# Patient Record
Sex: FEMALE | Race: WHITE
[De-identification: names, ages, dates, MRNs, and addresses within clinical notes are randomized per-mention and may not be internally consistent; named-entity substitution may affect disease eponyms.]

---

## 2020-05-03 NOTE — NUR
went to go check on pt and she was not in her bed.  pt's gown was on the bed 
and pt was no where to be found.  md notified.  pt had previously stated she 
had wanted to leave [facility] but had initially agreed on waiting until her second 
set of troponins came back.  pt did not have an iv due to pt refusing that as 
well.

## 2020-05-03 NOTE — NUR
pt s/w hospitalist and stated she did not want to be admitted and would just 
return to [facility] for further treatment

## 2020-08-17 ENCOUNTER — HOSPITAL ENCOUNTER (INPATIENT)
Dept: HOSPITAL 94 - ADULT MH | Age: 65
LOS: 37 days | Discharge: ANOTHER HOSPITAL | DRG: 751 | End: 2020-09-23
Attending: PSYCHIATRY & NEUROLOGY | Admitting: PSYCHIATRY & NEUROLOGY
Payer: COMMERCIAL

## 2020-08-17 VITALS — BODY MASS INDEX: 26.05 KG/M2 | HEIGHT: 64 IN | WEIGHT: 152.56 LBS

## 2020-08-17 VITALS — SYSTOLIC BLOOD PRESSURE: 97 MMHG | DIASTOLIC BLOOD PRESSURE: 45 MMHG

## 2020-08-17 DIAGNOSIS — E46: ICD-10-CM

## 2020-08-17 DIAGNOSIS — K59.00: ICD-10-CM
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
## 2020-08-17 DIAGNOSIS — D64.9: ICD-10-CM

## 2020-08-17 DIAGNOSIS — E86.0: ICD-10-CM

## 2020-08-17 DIAGNOSIS — T67.5XXA: ICD-10-CM
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
## 2020-08-17 DIAGNOSIS — Z59.0: ICD-10-CM

## 2020-08-17 DIAGNOSIS — G92: ICD-10-CM

## 2020-08-17 DIAGNOSIS — E87.6: ICD-10-CM

## 2020-08-17 DIAGNOSIS — F20.9: ICD-10-CM

## 2020-08-17 DIAGNOSIS — F29: Primary | ICD-10-CM

## 2020-08-17 DIAGNOSIS — F41.9: ICD-10-CM

## 2020-08-17 DIAGNOSIS — B85.2: ICD-10-CM

## 2020-08-17 DIAGNOSIS — F39: ICD-10-CM

## 2020-08-17 DIAGNOSIS — I25.10: ICD-10-CM

## 2020-08-17 DIAGNOSIS — I50.9: ICD-10-CM

## 2020-08-17 DIAGNOSIS — F32.9: ICD-10-CM

## 2020-08-17 PROCEDURE — 74018 RADEX ABDOMEN 1 VIEW: CPT

## 2020-08-17 PROCEDURE — 36415 COLL VENOUS BLD VENIPUNCTURE: CPT

## 2020-08-17 PROCEDURE — 80053 COMPREHEN METABOLIC PANEL: CPT

## 2020-08-17 PROCEDURE — 87081 CULTURE SCREEN ONLY: CPT

## 2020-08-17 PROCEDURE — 71045 X-RAY EXAM CHEST 1 VIEW: CPT

## 2020-08-17 PROCEDURE — 97116 GAIT TRAINING THERAPY: CPT

## 2020-08-17 PROCEDURE — 97162 PT EVAL MOD COMPLEX 30 MIN: CPT

## 2020-08-17 PROCEDURE — 99285 EMERGENCY DEPT VISIT HI MDM: CPT

## 2020-08-17 SDOH — ECONOMIC STABILITY - HOUSING INSECURITY: HOMELESSNESS: Z59.0

## 2020-08-17 NOTE — NUR
Admission Note

Why is patient here:



This is a 65-year-old female who was brought to the Emergency Room via EMS.  She was found 
in a pile of dirt and leaves on the ground.  The patient is somewhat confused and having 
auditory

hallucinations.  The patient has a history of schizophrenia as reported by the

ER nursing staff.  The patient stated she was feeling weak and exhausted. Patient is 
reported to have a schizophrenia history and is homeless. Patient initially was placed on a 
1799 hold as she was deemed gravely disabled by the ER Physician. Patient was then admitted 
to the Neuro floor where she was treated for heat exhaustion, metabolic encephalopathy, 
hypokalemia, and head lice. With those issues now resolved the patient is presented to the 
[facility] unit for being gravely disabled. A 5150 was placed on this date 
8/17/20 at 2130 hours for GD. The patient tolerated the admission process well but refused 
to sign paperwork. Patient exhibited understanding of 5150 process following explanation by 
the admitting nurse. Patient is slow to answer questions. Patient is delusional, she 
explains to this writer that she has been excluded from the "[facility] [employer]." Patient states 
she hears voices but will not elaborate on what they are saying.

## 2020-08-18 VITALS — SYSTOLIC BLOOD PRESSURE: 134 MMHG | DIASTOLIC BLOOD PRESSURE: 75 MMHG

## 2020-08-18 VITALS — DIASTOLIC BLOOD PRESSURE: 70 MMHG | SYSTOLIC BLOOD PRESSURE: 115 MMHG

## 2020-08-18 NOTE — NUR
Nursing Progress Note: 



Voluntary Hold



Legal hold: 5150



Report received from RN with use of SBAR



Why are they here: 

This is a 65-year-old female who was brought to the Emergency Room via EMS. She was found in 
a pile of dirt and leaves on the ground. The patient is somewhat confused and having 
auditory hallucinations. The patient has a history of schizophrenia, homelessness, and was 
weakand exhausted as reported by the ER nursing staff. Patient initially was placed on a 
1799 hold as she was deemed gravely disabled by the ER Physician. Patient was then admitted 
to the Neuro floor where she was treated for heat exhaustion, metabolic encephalopathy, 
hypokalemia, and head lice. A 5150 was placed on this date 8/17/20 at 2130 hours for GD. The 
patient tolerated the admission process well but refused to sign paperwork. Patient 
exhibited understanding of 5150 process following explanation by the admitting nurse. 
Patient is slow to answer questions. Patient is delusional, she explains to this writer that 
she has been excluded from the "[facility] [employer]." Patient states she hears voices but will 
not elaborate on what they are saying. 



Assessment



What has happened this shift:  

Received Pt in bed sleeping w/o distress at beginning of shift. Isolated to room for most of 
day. Walked about in late morning for a bit and attempted to open door by Group Room and 
responded well to limits and to not touch doors. Pt refused to talk beyond one sentence at a 
time and requested to talk later all day. Pt ate minimally and sat/slept in her chair 
intermittently. Pt appears to be fearful and is guarded with this RN. 



S/I, H/I: Pt denies

A/VH: Pt denies 

Sleep: Pt slept late 

ADL's: Independent. 

Group attendance: No

Were meds taken: No

Any med S/E: None 





Mental Status Exam



Appearance: Casual in scrubs

Eye contact: Good

Behavior: Isolative

Speech: Minimal, soft

Mood: Fearful

Affect: Congruent with mood

Thought process: Confused

Thought Content: Unable to assess 

Cognition: A&O X2

Insight: Poor

Judgement: Poor



Interventions



PRN's used: None



Therapeutic interventions: Maintained a safe and therapeutic environment, provided clear and 
simple instructions, monitored behaviors and needed intervention, provided positive 
encouragement, Q15min safety checks. 



Restraints/seclusion/emergency medication: N/A



Justification of Continued Inpatient Treatment: Patient continues to require medication 
adjustment in a safe and supportive environment until stable.

## 2020-08-18 NOTE — NUR
Nursing Progress Note: 



Voluntary Hold



Legal hold: 5150



Report received from RN with use of SBAR



Why are they here: 

This is a 65-year-old female who was brought to the Emergency Room via EMS. She was found in 
a pile of dirt and leaves on the ground. The patient is somewhat confused and having 
auditory hallucinations. The patient has a history of schizophrenia, homelessness, and was 
weakand exhausted as reported by the ER nursing staff. Patient initially was placed on a 
1799 hold as she was deemed gravely disabled by the ER Physician. Patient was then admitted 
to the Neuro floor where she was treated for heat exhaustion, metabolic encephalopathy, 
hypokalemia, and head lice. A 5150 was placed on this date 8/17/20 at 2130 hours for GD. The 
patient tolerated the admission process well but refused to sign paperwork. Patient 
exhibited understanding of 5150 process following explanation by the admitting nurse. 
Patient is slow to answer questions. Patient is delusional, she explains to this writer that 
she has been excluded from the "[facility] [employer]." Patient states she hears voices but will 
not elaborate on what they are saying. 



Assessment



What has happened this shift:  

Pt was lying in her bed in the fetal position at shift change and isolated to her room all 
evening.  Pt's roommate was sitting in the chair talking loudly but pt stated that she 
wasn't bothered by it.  Pt presents as paranoid and confused.  Pt would only answer with one 
word replies and did not offer up much info as to how she is doing.  Pt denied having any 
complaints and did not get out of bed for snack.  



S/I, H/I: Pt denies

A/VH: Pt denies 

Sleep: see sleep assessment

ADL's: Independent. 

Group attendance: No

Were meds taken: No

Any med S/E: None 





Mental Status Exam



Appearance: in scrubs, lying in bed in fetal position

Eye contact: fair

Behavior: Isolative

Speech: Minimal, soft

Mood: Fearful

Affect: Congruent with mood

Thought process: Confused

Thought Content: Unable to assess 

Cognition: A&O X2

Insight: Poor

Judgement: Poor



Interventions



PRN's used: None



Therapeutic interventions: Maintained a safe and therapeutic environment, provided clear and 
simple instructions, monitored behaviors and needed intervention, provided positive 
encouragement, Q15min safety checks. 



Restraints/seclusion/emergency medication: N/A



Justification of Continued Inpatient Treatment: Patient continues to require medication 
adjustment in a safe and supportive environment until stable.

## 2020-08-19 VITALS — DIASTOLIC BLOOD PRESSURE: 83 MMHG | SYSTOLIC BLOOD PRESSURE: 144 MMHG

## 2020-08-19 NOTE — NUR
Nursing Progress Note:



Legal hold: 5150



Client on voluntary/involuntary status for GD



Report received from nurse with use of SBAR: YAMILKA Colón



Why are they here: This is a 65-year-old female who was brought to the Emergency Room via 
EMS. She was found in a pile of dirt and leaves on the ground. The patient is somewhat 
confused and having auditory hallucinations. The patient has a history of schizophrenia, 
homelessness, and was weak and exhausted as reported by the ER nursing staff. Patient was 
then admitted to the Neuro floor where she was treated for heat exhaustion, metabolic 
encephalopathy, hypokalemia, and head lice. A 5150 was placed for GD. On admission, patient 
is delusional, she explains to this writer that she has been excluded from the "[facility] 
[employer]." Patient states she hears voices but will not elaborate on what they are saying. 



Assessment



What has happened this shift: Received pt. sleeping in bed at the beginning of the shift, 
she awoke for breakfast, however refused to eat in Group Room and ate meal in her room. Pt. 
only consumed about 25% of meal, and refused ordered HBA1C and Lipid labs, Dr. Roy 
notified. Attempted to complete 1:1 at bedside, however pt. non-compliant with mental health 
or physical assessment. When asked questions pt. responds very softly, inaudibly at times, 
or stares blankly ahead. She appears to be internally preoccupied and paranoid, staring at 
this writer with a look of fear on her face. Pt. refuses any PRN anxiolytics or 
interventions at this time, and isolates in bed throughout the day. Later, pt. asks this 
writer if she knows any Christians, she states, There's a meeting in the community room if 
they want to talk." This writer asked pt. if she would like to talk, and she stated in a 
paranoid delusional way, "No, please don't talk to me. I'm not a member of [employer]'s community." 


S/I, H/I: Unable to assess

A/VH: Unable to assess, however pt. appears internally preoccupied

Sleep: Sleep hours are 8.75, she naps throughout the day

ADL's: Requires encouragement

Group attendance: No

Were meds taken: None ordered

Any med S/E: N/A





Mental Status Exam



Appearance: Disheveled, however appropriately dressed

Eye contact: Poor or stares blankly

Behavior: RTC/Cooperative, fatigued, anxious, guarded, and withdrawn

Speech: Soft and hard to hear, does not respond or responds very minimally

Mood: Guarded and paranoid

Affect: Flat

Thought process: Poverty of thought with possible thought blocking

Thought Content: Delusions, religiosity, and possible internal preoccupation

Cognition: Unable to assess

Insight: Poor

Judgment: Poor







Interventions



PRN's used: None

Therapeutic interventions: Introduced self and attempted to establish rapport, maintained a 
safe and therapeutic environment, provided clear and simple instructions, attempted to 
orient to reality, encouraged ADLs and provided positive encouragement, and maintained a 
safe and therapeutic environment. 

Restraints/seclusion/emergency medication: N/A





Justification of Continued Inpatient Treatment: Pt. requires interruption of current crisis, 
medication adjustments, and a safe and supportive environment.

## 2020-08-19 NOTE — NUR
Nursing Progress Note:



Legal hold: 5150 for grave disability



Report received from Yifan PRATHER with use of SBAR



Why are they here: The patient was initially brought to the ER by EMS after she was found in 
the community laying in a pile of dirt and leaves. She was then admitted to the medical unit 
where she was treated for; Altered mental status, heat exhaustion, severe hypokalemia, 
generalized weakness and head lice. She has been a very poor historian and little is known 
about her past psychiatric care. She presented as depressed and appeared psychotic. Once she 
was medically cleared she was admitted to [facility] for stabilization of her psychiatric symptoms. 








Assessment



What has happened this shift: One to one with the patient to assess severity of disordered 
thought processes and to build a therapeutic bond. The patient has not left her room and sat 
in the chair by her bed curled up in a ball. Psychomotor retardation and at times catatonic 
like. She has not made any attempt to socialize or interact with others unless asked a 
specific question. She made poor eye contact. Her affect was pensive and worried but she 
denied anxiety. She was poorly oriented and stated the month was September but she did know 
the year. When asked what season it was she stated she was not sure. She answered most of 
the assessment questions with "I'm not sure" but the patient seemed more guarded than 
confused. Her replies to the assessment questions were slowed and hesitated before she 
replied. She did state she did not have a house but would not elaborate further. She was 
asked if she felt depressed she replied that she felt "just sad" She appeared distracted and 
when asked if she was hearing voices she stated, "I'm not sure" She refused offer of food 
and fluids and she when given a closed container of juice she studied it but then refused to 
drink it. She refused all offer of medications several times.  She refused to allow her 
vital signs to be taken. The charge RN was made aware and she contacted the psychiatrist and 
is aware that the patient has refused meds and food and fluids. She is not endorsing 
thoughts to harm herself or others. When asked how she would provide for her needs when she 
leaves the unit and she replied "I'm not sure" The patient's thought process appears to be 
distracted by internal stimuli. Her insight and judgement are poor.  She is not endorsing 
thoughts to harm herself or others. She is difficult to assess fully because of her minimal 
replies to the assessment questions. Her insight and judgement seemed to be very impaired. 







Justification of Continued Inpatient Treatment: The patient is unable to verbalize a plan 
for food shelter or clothing. She is not eating or drinking today on the unit. She is 
refusing medications and may require a riese if the physician feels it is warranted. It is 
unclear what she has been doing for housing when she is not in the hospital but she has been 
found down on the ground twice in the community in recent months.

## 2020-08-20 RX ADMIN — OLANZAPINE SCH MG: 5 TABLET, FILM COATED ORAL at 21:00

## 2020-08-20 RX ADMIN — PALIPERIDONE SCH MG: 3 TABLET, EXTENDED RELEASE ORAL at 21:00

## 2020-08-20 NOTE — NUR
Nursing Progress Note:



Legal hold: 5150



Client on /involuntary status for GD



Report received from nurse with use of SBAR: YAMILKA Nevarez



Why are they here: This is a 65-year-old female who was brought to the Emergency Room via 
EMS. She was found in a pile of dirt and leaves on the ground. The patient is somewhat 
confused and having auditory hallucinations. The patient has a history of schizophrenia, 
homelessness, and was weak and exhausted as reported by the ER nursing staff. Patient was 
then admitted to the Neuro floor where she was treated for heat exhaustion, metabolic 
encephalopathy, hypokalemia, and head lice. A 5150 was placed for GD. On admission, patient 
is delusional, she explains to this writer that she has been excluded from the "[facility] 
[employer]." Patient states she hears voices but will not elaborate on what they are saying. 



Assessment



What has happened this shift: Pt was sitting in the chair in her room at change of shift. Pt 
isolates to her room and is refusing to eat or drink. Asked patient if there are any foods 
she would prefer rather than what had been provided on her meal tray, Patient shakes her 
head and states "No nothing, I dont want to eat." She shakes her head no when I ask her 
reasons for not eating. Explained to patient I had noticed her sleeping in the chair last 
night and she explained "that bed is plugged into the wall so there is electricity in it, I 
don't think that is healthy." Suggested alternatives so she can rest and she declined saying 
she'd prefer to sleep in the chair. Pt refused meds at med pass. Attempted to educate 
patient on medications and she became irritated and stated "Im just going to wait until they 
force me to take them, I don't want them. " 



S/I, H/I: pt doesnt respond when asked 

A/VH: Continues to appear internally preoccupied AEB distractibility

Sleep: See sleep hours 

ADL's: Requires encouragement

Group attendance: No

Were meds taken: Pt. refused ordered meds

Any med S/E: N/A





Mental Status Exam



Appearance: Disheveled, however adequately dressed

Eye contact: Fair, stares blankly at times

Behavior: Resistive to care, anxious, guarded isolative, slightly agitated, and paranoid

Speech: Pt. answers questions selectively with minimal responses and stares blankly ahead. 

Mood: Guarded and paranoid

Affect: Constricted

Thought process: delusions

Thought Content: Delusions, religiosity, and possible internal preoccupation

Cognition: impaired states she doesnt know why she's here

Insight: Poor

Judgment: Poor







Interventions



PRN's used: None

Therapeutic interventions: Maintained a safe and therapeutic environment, provided clear and 
simple instructions, attempted to orient to reality, encouraged ADLs and provided positive 
encouragement, provided medication education and monitored/encouraged meal intake, and 
maintained Q 15min safety checks.

Restraints/seclusion/emergency medication: N/A





Justification of Continued Inpatient Treatment: Per Dr. Roy, pt. remains gravely disabled, 
and requires medication adjustments and a safe and supportive environment. He plans to file 
a Riese.

## 2020-08-20 NOTE — NUR
nursing note

The patient curled up in her chair she again was encouraged to take medications but she 
continues to decline.

## 2020-08-20 NOTE — NUR
CM



Presenting Issues: 

Pt's not talking to staff, refusing meals & care. She's now on a 5250. 



Interventions: 

SS had contact w/a board & care in [facility], [phone] they confirmed that pt's 
sister, Kirsten lives at the [facility] and pt had visited her sister there numerous times, the two 
have a good sibling relationship. 



Plan: 

When pt is able to talk SS will obtain consent to speak w/the sister and facilitate pt 
contacting her sister. 

Katalina Zacarias LCSW




-------------------------------------------------------------------------------

Addendum: 08/20/20 at 1659 by Katalina Zacarias 

-------------------------------------------------------------------------------

Amended: Links added.

## 2020-08-20 NOTE — NUR
Nursing Progress Note:



Legal hold: 5150



Client on voluntary/involuntary status for GD



Report received from nurse with use of SBAR: YAMILKA Colón



Why are they here: This is a 65-year-old female who was brought to the Emergency Room via 
EMS. She was found in a pile of dirt and leaves on the ground. The patient is somewhat 
confused and having auditory hallucinations. The patient has a history of schizophrenia, 
homelessness, and was weak and exhausted as reported by the ER nursing staff. Patient was 
then admitted to the Neuro floor where she was treated for heat exhaustion, metabolic 
encephalopathy, hypokalemia, and head lice. A 5150 was placed for GD. On admission, patient 
is delusional, she explains to this writer that she has been excluded from the "[facility] 
[employer]." Patient states she hears voices but will not elaborate on what they are saying. 



Assessment



What has happened this shift: Received pt. curled up in a chair at bedside at the beginning 
of the shift, she remained sitting up in this chair throughout the shift. However, pt. does 
get up and pace in her room intermittently, and at one point is found kneeling in a position 
of prayer on the floor. She refused V/S and again refused physical assessment. Pt. continues 
to appear anxious and paranoid, states, "Please no, I'm sorry." She also continues to refuse 
all meals, education provided by this writer on the the importance of eating/drinking in 
order to get better, as pt. is perseverating on discharge and knows her 5150 expires today. 
However, pt. became agitated and threw her whole breakfast tray in the garbage, she stated 
irritably, "I'm aware!" Pt. later apologized for being rude, however continued to refuse all 
meals throughout the day. Dr. Roy made aware and advises staff to continue to encourage and 
monitor patient. 



Attempted to complete 1:1 at bedside, pt. again uncooperative, and continues to answer 
questions selectively with minimal responses and sometimes not at all and will stare blankly 
ahead. When questioned regarding A/V/HA, pt. does not answer, however continues to appear 
internally preoccupied AEB distractibility. Pt. does make a delusional statement regarding 
her belief that she has gonorrhea, and requests to talk to Dr. Roy. However, pt. denies any 
dysuria or vaginal discharge, and when questioned by this writer regarding her s/s, she 
states, "My palms and my scalp are pale." Pt. then reports concern regarding the scabs 
present on her posterior neck, scalp, and scapular regions r/t previous head-lice, however 
she refuses to allow this writer to assess them, will continue to monitor. 



After talking to Dr. Roy this AM, pt. agrees to take Paliperidone and Invega, however when 
this writer attempted to administer medications she refused and continued to be very 
paranoid. Dr. Roy made aware of pt's refusal, and MD again spoke to pt. regarding 
medication in the afternoon and she agreed to take one medication, Paliperidone. This writer 
again attempted to administer medication and provided education, pt. stated, "I don't like 
taking medication, there has to be a better way." Dr. Roy again made aware and plans to 
file a Riese.

S/I, H/I: Unable to assess

A/VH: Continues to appear internally preoccupied AEB distractibility

Sleep: Sleep hours are 7.75, she naps intermittently throughout the day

ADL's: Requires encouragement

Group attendance: No

Were meds taken: Pt. refused ordered Paliperidone and Effexor

Any med S/E: N/A





Mental Status Exam



Appearance: Disheveled, however appropriately dressed

Eye contact: Fair, stares blankly at times

Behavior: Resistive to care, anxious, guarded isolative, slightly agitated, and paranoid

Speech: Pt. answers questions selectively with minimal responses and sometimes not at all 
and will stare blankly ahead. Speech remains soft, however better able to understand pt. 
than previous day.

Mood: Guarded and paranoid

Affect: Constricted

Thought process: Poverty of thought with possible thought blocking

Thought Content: Delusions, religiosity, and possible internal preoccupation

Cognition: Unable to assess

Insight: Poor

Judgment: Poor







Interventions



PRN's used: None

Therapeutic interventions: Maintained a safe and therapeutic environment, provided clear and 
simple instructions, attempted to orient to reality, encouraged ADLs and provided positive 
encouragement, provided medication education and monitored/encouraged meal intake, and 
maintained Q 15min safety checks.

Restraints/seclusion/emergency medication: N/A





Justification of Continued Inpatient Treatment: Per Dr. Roy, pt. remains gravely disabled, 
and requires medication adjustments and a safe and supportive environment. He plans to file 
a Riese.

## 2020-08-20 NOTE — NUR
1:1  Patient was introduced and invited to attend group art therapy today and everyday since 
her admission. Patient remains withdrawn and stating "not today, maybe later...."  Patient 
was asked if she would like to talk further?  Patient did not want to talk any further. 
Patient was asked if she would like to have/hold a 'stuffed animal' (since she had been 
observed frequently in a fetal position.)  Patient replied "No".... she remained soft 
spoken, yet appropriate in her response. 



Adriana Emery MA, LMFT #[id number]

Art Therapist, [facility]

-------------------------------------------------------------------------------

Addendum: 08/21/20 at 1859 by Adriana Emery 

-------------------------------------------------------------------------------

Amended: Links added.

## 2020-08-21 VITALS — DIASTOLIC BLOOD PRESSURE: 79 MMHG | SYSTOLIC BLOOD PRESSURE: 133 MMHG

## 2020-08-21 RX ADMIN — PALIPERIDONE SCH MG: 3 TABLET, EXTENDED RELEASE ORAL at 20:47

## 2020-08-21 RX ADMIN — PALIPERIDONE SCH MG: 3 TABLET, EXTENDED RELEASE ORAL at 21:00

## 2020-08-21 RX ADMIN — OLANZAPINE SCH MG: 5 TABLET, FILM COATED ORAL at 21:00

## 2020-08-21 RX ADMIN — OLANZAPINE SCH MG: 5 TABLET, FILM COATED ORAL at 20:47

## 2020-08-21 RX ADMIN — VENLAFAXINE HYDROCHLORIDE SCH MG: 75 CAPSULE, EXTENDED RELEASE ORAL at 10:29

## 2020-08-21 NOTE — NUR
Nursing Progress Note:



Legal hold: 5250



Client on voluntary/involuntary status for GD



Report received from nurse with use of SBAR: Alesia Churchill RN



Why are they here: This is a 65-year-old female who was brought to the Emergency Room via 
EMS. She was found in a pile of dirt and leaves on the ground. The patient is somewhat 
confused and having auditory hallucinations. The patient has a history of schizophrenia, 
homelessness, and was weak and exhausted as reported by the ER nursing staff. Patient was 
then admitted to the Neuro floor where she was treated for heat exhaustion, metabolic 
encephalopathy, hypokalemia, and head lice. A 5150 was placed for GD. On admission, patient 
is delusional, she explains to this writer that she has been excluded from the "[place]." Patient states she hears voices but will not elaborate on what they are saying. 



Assessment



What has happened this shift: Received pt. curled up in a chair at bedside at the beginning 
of the shift with blanket over her head, she again refused V/S or physical assessment and 
would not remove blanket from head. This writer educated pt. on the need to obtain V/S to 
monitor how she is doing, pt. became agitated and yelled out, "I'm not doing okay!" She 
again refused breakfast and education and encouragement was again provided by this writer 
(packaged snacks offered), however pt. again became agitated and angrily pulled a cup out of 
this writer's hand, stated, "Leave me alone!" EBONY Ramos notified of pt's ongoing refusal 
to eat. This writer re-approached pt. later and offered ordered Effexor, pt. initially 
agreed to take the medication, however when medication brought to her she refused. Pt. 
stared at nurse blankly and stated softly, "Please no." Later at approximately 10:30, pt. 
ambulated to the nurse's station requesting Effexor, she consented to taking this medication 
along with a packaged apple juice. After taking medication, pt. ambulated into the hallway 
looking for the bathroom, she was redirected to the BR in her room, pt. stated in a paranoid 
way, "I think that is hers" (referring to her roommate). She was able to be redirected, and 
consented to using the BR.

Attempted to complete 1:1 at bedside, however pt. continues to stare blankly or answer 
questions selectively with minimal responses. She appears distracted and internally 
preoccupied. Pt. does request to know when her court date is, and she is provided this 
information by writer. Later, pt. is observed to be scratching at healing scabs present on 
head, back, chest, and arms. This writer encourages a shower and pt. complies, able to do so 
independently. Afterwards, ABT ointment is applied to areas per consent from EBONY Ramos. 
Pt. was also able to eat 75% of her lunch, however continued to isolate in her room sitting 
up in her chair throughout the day. 

S/I, H/I: Unable to assess

A/VH: Continues to appear internally preoccupied AEB distractibility and significant delay 
in responses

Sleep: Sleep hours are 7, she naps intermittently throughout the day

ADL's: Requires encouragement

Group attendance: No

Were meds taken: Yes

Any med S/E: N/A





Mental Status Exam



Appearance: Freshly showered and appropriately dressed

Eye contact: Fair, stares blankly at times

Behavior: Resistive to care, anxious, guarded isolative, slightly agitated, and paranoid

Speech: Pt. answers questions selectively with minimal responses and sometimes not at all 
and will stare blankly ahead. Speech remains soft, however becomes louder when agitated. 

Mood: Guarded and paranoid

Affect: Constricted

Thought process: Poverty of thought with possible thought blocking

Thought Content: Paranoid delusions and internal preoccupation

Cognition: Unable to assess

Insight: Poor

Judgment: Poor







Interventions



PRN's used: None

Therapeutic interventions: Maintained a safe and therapeutic environment, provided clear and 
simple instructions, attempted to orient to reality, encouraged ADLs and provided positive 
encouragement, provided medication education and monitored/encouraged meal intake, monitored 
scabbed areas and applied ABT ointment, and maintained Q 15min safety checks.

Restraints/seclusion/emergency medication: N/A





Justification of Continued Inpatient Treatment: Per Dr. Roy, pt. remains gravely disabled, 
and requires medication adjustments and a safe and supportive environment. He plans to file 
a Riese.

## 2020-08-21 NOTE — NUR
Nursing Progress Note:



Legal hold: 5250



Client on involuntary status for GD



Report received from nurse with use of SBAR: YAMILKA Nevarez



Why are they here: This is a 65-year-old female who was brought to the Emergency Room via 
EMS. She was found in a pile of dirt and leaves on the ground. The patient is somewhat 
confused and having auditory hallucinations. The patient has a history of schizophrenia, 
homelessness, and was weak and exhausted as reported by the ER nursing staff. Patient was 
then admitted to the Neuro floor where she was treated for heat exhaustion, metabolic 
encephalopathy, hypokalemia, and head lice. A 5150 was placed for GD. On admission, patient 
is delusional, she explains to this writer that she has been excluded from the "[place]." Patient states she hears voices but will not elaborate on what they are saying. 



Assessment



What has happened this shift: Pt was sitting in her chair in her room at change of shift. Pt 
gives minimal responses to questions "No, No thank you, I'm fine." Pt came out of her room 
several times and was checking the doors to leave. Pt was looking into other patients 
bedrooms for a way out. Pt was redirected back to her room several times. Pt was encouraged 
to lay down in her bed rather than sleeping in the chair, sat on patients bed and showed her 
it was safe despite the fear she had about the bed being plugged into the wall. Pt was also 
encouraged to have a snack. Pt requested an apple juice and agreed cranberry juice was ok as 
we were out of apple. Pt was resting in her bed for a few minutes then got out of bed and 
threw her cranberry juice down the becerra towards the sitter at another room. Pt then made her 
bed and returned to her chair. Pt was encouraged by myself and then charge nurse to take 
medications but patient declined stating "no Im fine, I will figure something out tomorrow." 
 Pt later climbed into bed and went to sleep. 



S/I, H/I: Pt denies

A/VH: Continues to appear internally preoccupied AEB distractibility and significant delay 
in responses

Sleep: see sleep hours

ADL's: Requires encouragement

Group attendance: No

Were meds taken: No 

Any med S/E: N/A





Mental Status Exam



Appearance: showered and appropriately dressed wearing green scrubs

Eye contact: Fair, stares blankly at times

Behavior: Bizzare, Resistive to care, anxious, guarded isolative, slightly agitated, and 
paranoid

Speech: Pt. answers questions selectively with minimal responses and sometimes not at all 
and will stare blankly ahead. Speech remains soft, however becomes louder when agitated. 

Mood: Anxious, Guarded and paranoid

Affect: Constricted

Thought process: Poverty of thought with possible thought blocking

Thought Content: Paranoid delusions and internal preoccupation

Cognition: Unable to assess

Insight: Poor

Judgment: Poor







Interventions



PRN's used: None

Therapeutic interventions: Maintained a safe and therapeutic environment, provided clear and 
simple instructions, attempted to orient to reality, encouraged ADLs and provided positive 
encouragement, provided medication education and monitored/encouraged meal intake, monitored 
scabbed areas and applied ABT ointment, and maintained Q 15min safety checks.

Restraints/seclusion/emergency medication: N/A





Justification of Continued Inpatient Treatment: Per Dr. Roy, pt. remains gravely disabled, 
and requires medication adjustments and a safe and supportive environment. He plans to file 
a Riese.

## 2020-08-22 VITALS — DIASTOLIC BLOOD PRESSURE: 63 MMHG | SYSTOLIC BLOOD PRESSURE: 90 MMHG

## 2020-08-22 RX ADMIN — OLANZAPINE SCH MG: 5 TABLET, FILM COATED ORAL at 21:00

## 2020-08-22 RX ADMIN — PALIPERIDONE SCH MG: 3 TABLET, EXTENDED RELEASE ORAL at 21:00

## 2020-08-22 RX ADMIN — VENLAFAXINE HYDROCHLORIDE SCH MG: 75 CAPSULE, EXTENDED RELEASE ORAL at 08:22

## 2020-08-22 NOTE — NUR
Nursing Progress Note:



Legal hold: 5250



Client on involuntary status for GD



Report received from LIBAN Fang with use of SBAR: 



Why are they here: This is a 65-year-old female who was brought to the Emergency Room via 
EMS. She was found in a pile of dirt and leaves on the ground. The patient is somewhat 
confused and having auditory hallucinations. The patient has a history of schizophrenia, 
homelessness, and was weak and exhausted as reported by the ER nursing staff. Patient was 
then admitted to the Neuro floor where she was treated for heat exhaustion, metabolic 
encephalopathy, hypokalemia, and head lice. A 5150 was placed for GD. On admission, patient 
is delusional, she explains to this writer that she has been excluded from the "[place] 
[place]." Patient states she hears voices but will not elaborate on what they are saying. 



Assessment



What has happened this shift: Pt sleeping at beginning of shift. She ate her breakfast in 
her room. She only responded minimally to RN questions with delay and shaking her head no 
or incomplete answers. When RN gave her medication she continued eating. RN asked if she 
would please take the medication and she held it in her hand for a while and then responded 
not now. RN informed her she would end up needing injections if she continued to refuse 
and pt took the medication. PT later walked to RN obs room and called RN over to her and 
states  I just need you to know I have a hard time remembering . She napped for the 
afternoon. RN tried to go talk with her a couple times and she pulled her blankets over her 
head more and did not respond. 





S/I, H/I: Pt denies

A/VH: I dont know

Sleep: 6 h per sleep assessment

ADL's: Requires encouragement

Group attendance: No

Were meds taken: Yes 

Any med S/E: None noted





Mental Status Exam



Appearance: Wearing unit scrubs

Eye contact: Direct

Behavior: Seems paranoid/ guarded. Thinks for a long time before responding and then with 
minimal if any response. 

Speech: Soft

Mood: Guarded

Affect: Flat

Thought process: Unable to assess

Thought Content: Unable to assess. 

Cognition: Alert

Insight: Poor

Judgment: Poor







Interventions



PRN's used: None



Therapeutic interventions: Maintained a safe and therapeutic environment, provided clear and 
simple instructions, attempted to orient to reality, encouraged ADLs and provided positive 
encouragement, provided medication education and monitored/encouraged meal intake, monitored 
scabbed areas and applied ABT ointment, and maintained Q 15min safety checks.

Restraints/seclusion/emergency medication: N/A





Justification of Continued Inpatient Treatment: Per Dr. Roy, pt. remains gravely disabled, 
and requires medication adjustments and a safe and supportive environment. He plans to file 
a Riese.

## 2020-08-22 NOTE — NUR
Initial: Pt admit with psychosis. Currently on a regular diet documented 

with occasional 100% PO intake however mostly refusing meals not meeting 

nutrient needs. Per RN notes pt stating she doesn't want to eat despite 

encouragement to eat and being offered packaged foods. Pt currently 

documented as A/O x 1, resistive to care, agitated, anxious, and paranoid. 

Current mentation likely impacting PO intake. Noted that pt also refusing 

medications. LBM documented as 8/17 though pt denies GI symptoms and is 

documented to be refusing physical assessment. D/w dietary to trial prunes 

and prune juice with next meal to assist with bowel regularity. Further 

nutrition intervention not appropriate at this time given current 

mentation. Will continue to follow closely and make recommendations as 

appropriate. Pt may benefit from ONS once more accepting of food.

Recommendations:

1) Continue regular diet

2) Encourage PO intake

3) Monitor need for ONS

4) Bowel care PRN

5) Scaled weights per rx

-------------------------------------------------------------------------------

Addendum: 08/22/20 at 1057 by Dariana Fuentes RD

-------------------------------------------------------------------------------

Amended: Links added.

## 2020-08-23 VITALS — SYSTOLIC BLOOD PRESSURE: 106 MMHG | DIASTOLIC BLOOD PRESSURE: 74 MMHG

## 2020-08-23 VITALS — SYSTOLIC BLOOD PRESSURE: 132 MMHG | DIASTOLIC BLOOD PRESSURE: 78 MMHG

## 2020-08-23 RX ADMIN — OLANZAPINE SCH MG: 5 TABLET, FILM COATED ORAL at 20:43

## 2020-08-23 RX ADMIN — VENLAFAXINE HYDROCHLORIDE SCH MG: 75 CAPSULE, EXTENDED RELEASE ORAL at 08:00

## 2020-08-23 RX ADMIN — PALIPERIDONE SCH MG: 3 TABLET, EXTENDED RELEASE ORAL at 20:43

## 2020-08-23 NOTE — NUR
Nursing Progress Note:



Legal hold: 5250



Client on involuntary status for GD



Report received from YAMILKA Nevarez with use of SBAR: 



Why are they here: This is a 65-year-old female who was brought to the Emergency Room via 
EMS. She was found in a pile of dirt and leaves on the ground. The patient is somewhat 
confused and having auditory hallucinations. The patient has a history of schizophrenia, 
homelessness, and was weak and exhausted as reported by the ER nursing staff. Patient was 
then admitted to the Neuro floor where she was treated for heat exhaustion, metabolic 
encephalopathy, hypokalemia, and head lice. A 5150 was placed for GD. On admission, patient 
is delusional, she explains to this writer that she has been excluded from the "[facility] 
[place]." Patient states she hears voices but will not elaborate on what they are saying. 



Assessment



What has happened this shift: 

Patient isolates in her room napping. "1:1 Interview: This writer introduced himself to the 
patient, she indicates she remembers me from her admission. Patient is slow to respond to 
questions from this writer, her answers are minimal. Patient denies H/I, S/I, or any 
hallucinations. Patient does not elaborate on any answers. Patient refuses all medications 
without explanation. She rests in bed in a fetal position on her left side, she wears a bed 
sheet like a wrap, she is covered with a single blanket. Patient denies any need for an 
additional blanket. This patient is reassured that she is in a safe place. Patient does not 
elaborate on her thoughts, she is difficult to evaluate.





S/I, H/I: Pt denies.

A/VH: Denies.

Sleep: Mostly sleepinf.

ADL's: Requires encouragement.

Group attendance: N/A on nights. 

Were meds taken: No, patient refused. 

Any med S/E: None noted or observed. 





Mental Status Exam



Appearance: Wearing unit scrubs.

Eye contact: Poor. 

Behavior: Seems paranoid/ guarded. Thinks for a long time before responding and then with 
minimal if any response. 

Speech: Soft, minimal.

Mood: Guarded.

Affect: Flat.

Thought process: Unable to assess.

Thought Content: Unable to assess. 

Cognition: Alert.

Insight: Poor.

Judgment: Poor.







Interventions



PRN's used: None



Therapeutic interventions: Maintained a safe and therapeutic environment, provided clear and 
simple instructions, attempted to orient to reality, encouraged ADLs and provided positive 
encouragement, provided medication education and monitored/encouraged meal intake, monitored 
scabbed areas and applied ABT ointment, and maintained Q 15min safety checks.

Restraints/seclusion/emergency medication: N/A





Justification of Continued Inpatient Treatment: Per Dr. Roy, pt. remains gravely disabled, 
and requires medication adjustments and a safe and supportive environment. He plans to file 
a Riese.

## 2020-08-23 NOTE — NUR
Nursing Progress Note:



Legal hold: 5250



Client on voluntary/involuntary status for GD



Report received from nurse with use of SBAR: Alesia Churchill RN



Why are they here: This is a 65-year-old female who was brought to the Emergency Room via 
EMS. She was found in a pile of dirt and leaves on the ground. The patient is somewhat 
confused and having auditory hallucinations. The patient has a history of schizophrenia, 
homelessness, and was weak and exhausted as reported by the ER nursing staff. Patient was 
then admitted to the Neuro floor where she was treated for heat exhaustion, metabolic 
encephalopathy, hypokalemia, and head lice. A 5150 was placed for GD. On admission, patient 
is delusional, she explains to this writer that she has been excluded from the "[place] 
[place]." Patient states she hears voices but will not elaborate on what they are saying. 



Assessment



What has happened this shift: Received pt. sleeping curled up in a fetal position, laying in 
bed at the beginning of the shift, wrapped in a blanket. She awoke and requested to shower, 
able to do so independently. Pt. allowed staff to obtain V/S, however continues to refuse 
physical assessment. She also continues to initially refuse meals, however when tray is left 
in front of her in her room, pt. gets up in her chair and is able eat approximately 25% of 
each meal. This writer attempted to administer AM medication following meal, however pt. 
refused, stated, "Maybe later." This writer attempted to administer medication again in 
approximately half an hour and provided pt. with a packaged juice container to take it with, 
however she continued to refuse. This writer provided medication education, and education 
regarding pt's upcoming court date tomorrow (doctor plans to Riese pt.), however she 
continued to refuse. Pt. stated, "No thank you, it's okay." Pt. did not become agitated as 
she had done during previous shifts.  

Attempted to complete 1:1 at bedside, however pt. continues to stare blankly, mumble to 
herself as if internally preoccupied, or answer questions with a very delayed and minimal 
response. When this writer questioned pt. regarding anxiety, she stated, "I don't even know 
how to answer that." Later, pt. calls this writer into her room and shows her, her tongue 
(has some whiteness on it). Pt. reports in a paranoid way that she believes she may have 
Covid-19 because of her tongue. This writer encouraged pt. to brush her teeth/tongue and 
provided her with a toothbrush. Pt. reported understanding and complied. She continued to 
isolate in her room throughout the shift, sitting up in chair. 

S/I, H/I: Unable to assess

A/VH: Continues to appear internally preoccupied AEB distractibility and significant delay 
in responses

Sleep: Sleep hours are 9.5, she naps intermittently throughout the day

ADL's: Requires encouragement

Group attendance: N/A

Were meds taken: Yes

Any med S/E: N/A





Mental Status Exam



Appearance: Freshly showered and appropriately dressed

Eye contact: Poor, avoidant, stares blankly

Behavior: Resistive to care, anxious, guarded, isolative, and paranoid

Speech: Pt. mumbles softly to herself, speech is soft

Mood: Guarded and paranoid

Affect: Flat

Thought process: Poverty of thought with possible thought blocking

Thought Content: Paranoid delusions and internal preoccupation

Cognition: Unable to assess

Insight: Poor

Judgment: Poor







Interventions



PRN's used: None

Therapeutic interventions: Maintained a safe and therapeutic environment, provided clear and 
simple instructions, attempted to orient to reality, encouraged ADLs and provided positive 
encouragement, provided medication education and monitored/encouraged meal intake, educated 
on upcoming court date, and maintained Q 15min safety checks.

Restraints/seclusion/emergency medication: N/A





Justification of Continued Inpatient Treatment: Per EBONY Ramos, remains gravely disabled, 
and requires medication adjustments and a safe and supportive environment. Plans to file a 
Riese.

## 2020-08-24 VITALS — SYSTOLIC BLOOD PRESSURE: 120 MMHG | DIASTOLIC BLOOD PRESSURE: 87 MMHG

## 2020-08-24 LAB
ALBUMIN SERPL BCP-MCNC: 3 G/DL (ref 3.4–5)
ALBUMIN/GLOB SERPL: 0.9 {RATIO} (ref 1.1–1.5)
ALP SERPL-CCNC: 43 IU/L (ref 46–116)
ALT SERPL W P-5'-P-CCNC: 20 U/L (ref 12–78)
ANION GAP SERPL CALCULATED.3IONS-SCNC: 5 MMOL/L (ref 8–16)
AST SERPL W P-5'-P-CCNC: 13 U/L (ref 10–37)
BILIRUB SERPL-MCNC: 0.4 MG/DL (ref 0.1–1)
BUN SERPL-MCNC: 15 MG/DL (ref 7–18)
BUN/CREAT SERPL: 14.4 (ref 6.6–38)
CALCIUM SERPL-MCNC: 9.4 MG/DL (ref 8.5–10.1)
CHLORIDE SERPL-SCNC: 105 MMOL/L (ref 99–107)
CO2 SERPL-SCNC: 31.5 MMOL/L (ref 24–32)
CREAT SERPL-MCNC: 1.04 MG/DL (ref 0.4–0.9)
GFR SERPL CREATININE-BSD FRML MDRD: 54 ML/MIN
GLUCOSE SERPL-MCNC: 93 MG/DL (ref 70–104)
POTASSIUM SERPL-SCNC: 3.9 MMOL/L (ref 3.5–5.1)
PROT SERPL-MCNC: 6.3 G/DL (ref 6.4–8.2)
SODIUM SERPL-SCNC: 141 MMOL/L (ref 135–145)

## 2020-08-24 RX ADMIN — Medication SCH CAN: at 12:05

## 2020-08-24 RX ADMIN — Medication SCH CAN: at 17:56

## 2020-08-24 RX ADMIN — VENLAFAXINE HYDROCHLORIDE SCH MG: 75 CAPSULE, EXTENDED RELEASE ORAL at 08:00

## 2020-08-24 RX ADMIN — PALIPERIDONE SCH MG: 3 TABLET, EXTENDED RELEASE ORAL at 20:37

## 2020-08-24 RX ADMIN — Medication SCH CAN: at 12:53

## 2020-08-24 NOTE — NUR
Nursing Progress Note:



Legal hold: 5250



Client on voluntary/involuntary status for GD



Report received from YAMILKA Nevarez with use of SBAR.



Why are they here: This is a 65-year-old female who was brought to the Emergency Room via 
EMS. She was found in a pile of dirt and leaves on the ground. The patient is somewhat 
confused and having auditory hallucinations. The patient has a history of schizophrenia, 
homelessness, and was weak and exhausted as reported by the ER nursing staff. Patient was 
then admitted to the Neuro floor where she was treated for heat exhaustion, metabolic 
encephalopathy, hypokalemia, and head lice. A 5150 was placed for GD. On admission, patient 
is delusional, she explains to this writer that she has been excluded from the "[place] 
[place]." Patient states she hears voices but will not elaborate on what they are saying. 



Assessment



What has happened this shift: 

Patient isolates in room. She is sitting in chair with a blanket over her shoulders. Patient 
speaks quietly, she is slow to respond to questions. When questioned patient looks into the 
ether and is slow to respond. Patient is fearful looking and exhibits paranoia. She denies 
S/I or H/I. The patient is reassured by this writer that she is in a safe place. Patient is 
medication compliant save for her iron replacement. Patient will not explain why she doesn't 
take her iron supplement.



S/I, H/I: Denies.

A/VH: Continues to be preoccupied.

Sleep: Intermittent, will tally at 0500.

ADL's: Requires encouragement.

Group attendance: N/A

Were meds taken: Yes

Any med S/E: N/A





Mental Status Exam



Appearance: Clean, showered on day shift.

Eye contact: Poor.

Behavior: Resistive to care, anxious, guarded, isolative, and paranoid.

Speech: Pt. mumbles softly to herself, speech is soft

Mood: Guarded and paranoid, frightened.

Affect: Flat.

Thought process: Poverty of thought with possible thought blocking.

Thought Content: Paranoid delusions and internal preoccupation.

Cognition: Unable to assess.

Insight: Poor.

Judgment: Poor.







Interventions



PRN's used: None

Therapeutic interventions: Maintained a safe and therapeutic environment, provided clear and 
simple instructions, attempted to orient to reality, encouraged ADLs and provided positive 
encouragement, provided medication education and monitored/encouraged meal intake, educated 
on upcoming court date, and maintained Q 15min safety checks.

Restraints/seclusion/emergency medication: N/A





Justification of Continued Inpatient Treatment: Per EBONY Ramos, remains gravely disabled, 
and requires medication adjustments and a safe and supportive environment. Plans to file a 
Riese.

## 2020-08-24 NOTE — NUR
Nursing Progress Note:



Legal hold: 5250



Client on involuntary status for GD



Report received from nurse with use of SBAR: YAMILKA Lopez



Why are they here: This is a 65-year-old female who was brought to the Emergency Room via 
EMS. She was found in a pile of dirt and leaves on the ground. The patient is somewhat 
confused and having auditory hallucinations. The patient has a history of schizophrenia, 
homelessness, and was weak and exhausted as reported by the ER nursing staff. Patient was 
then admitted to the Neuro floor where she was treated for heat exhaustion, metabolic 
encephalopathy, hypokalemia, and head lice. A 5150 was placed for GD. On admission, patient 
is delusional, she explains to this writer that she has been excluded from the "[facility] 
[place]." Patient states she hears voices but will not elaborate on what they are saying. 



Assessment



What has happened this shift: Received pt. sleeping in bed curled up in a fetal position at 
the beginning of the shift. She initially refused CMP lab draw, however when provided 
additional encouragement and education pt. consented. Pt. also consented to V/S and physical 
assessment this shift, however continues to refuse medications. She states, "No, but I want 
to go to he court hearing today at 1:30."   This writer again provided medication education 
and the doctor's plan to Riese, however pt. continued to refuse, stated, "No thank you, I'll 
wait until after court." Attempted to complete 1:1 at bedside, however pt. continues to be 
guarded and presents as paranoid and pulls the blanket over her head, states, "I'm okay." 

Pt. continues to be encouraged by staff to consume fluids and meals, and receives all meals 
in her room r/t her paranoia and refusal to go to the Group Room. She is observed to be 
consuming adequate fluids this shift, both water and juice, and is able to eat 100% of her 
lunch. Obtained new order for Ensure TID with meals from Dr. Roy, pt. consumed 100%. During 
lunch time pt. was sitting up in bed eating and affect appeared brighter, she asked this 
writer to show her where the court hearing would be. Pt. then thanked this writer and 
returned to her room in order to await court. Per Dr. Roy, pt. attended court, however was 
only able to stay for approximately five minutes before retreating back to her room. Pt. is 
now Riesed, and Dr. Roy will order IM medication to administer in place of psychiatric oral 
medication refusal. Pt. continued to isolate in her room throughout the afternoon, curled up 
in fetal position in bed. 

S/I, H/I: Unable to assess

A/VH: Continues to appear internally preoccupied AEB distractibility and significant delay 
in responses

Sleep: Sleep hours are 6.75, she naps intermittently throughout the day

ADL's: Requires encouragement

Group attendance: N/A

Were meds taken: Yes

Any med S/E: N/A





Mental Status Exam



Appearance: Somewhat disheveled, however appropriately dressed

Eye contact: Poor, avoidant, stares blankly

Behavior: Resistive to care, anxious, guarded, isolative, and paranoid

Speech: Pt. mumbles softly to herself, speech is soft

Mood: Guarded and paranoid

Affect: Flat

Thought process: Poverty of thought with possible thought blocking

Thought Content: Paranoid delusions and internal preoccupation

Cognition: Unable to assess

Insight: Poor

Judgment: Poor







Interventions



PRN's used: None

Therapeutic interventions: Maintained a safe and therapeutic environment, provided clear and 
simple instructions, attempted to orient to reality, encouraged ADLs and provided positive 
encouragement, provided medication education and monitored/encouraged meal/fluid intake, 
educated regarding Riese, and maintained Q 15min safety checks.

Restraints/seclusion/emergency medication: N/A





Justification of Continued Inpatient Treatment: Per EBONY Ramos, remains gravely disabled, 
and requires medication adjustments and a safe and supportive environment. Pt. is now 
Riesed.

## 2020-08-25 VITALS — SYSTOLIC BLOOD PRESSURE: 115 MMHG | DIASTOLIC BLOOD PRESSURE: 72 MMHG

## 2020-08-25 VITALS — SYSTOLIC BLOOD PRESSURE: 104 MMHG | DIASTOLIC BLOOD PRESSURE: 64 MMHG

## 2020-08-25 RX ADMIN — Medication SCH CAN: at 12:51

## 2020-08-25 RX ADMIN — Medication SCH CAN: at 18:04

## 2020-08-25 RX ADMIN — VENLAFAXINE HYDROCHLORIDE SCH MG: 75 CAPSULE, EXTENDED RELEASE ORAL at 08:07

## 2020-08-25 RX ADMIN — PALIPERIDONE SCH MG: 3 TABLET, EXTENDED RELEASE ORAL at 20:17

## 2020-08-25 RX ADMIN — Medication SCH CAN: at 08:00

## 2020-08-25 RX ADMIN — VENLAFAXINE HYDROCHLORIDE SCH MG: 75 CAPSULE, EXTENDED RELEASE ORAL at 09:14

## 2020-08-25 NOTE — NUR
Nursing Note: Pt. refused ordered Effexor, Vitamin C, and Iron this morning along with 
breakfast. Education regarding medication and Riese provided to pt. by this writer, however 
pt. continued to refuse. Dr. Roy made aware of pt's refusal, orders for additional one-time 
order of Paliperidone 3mg tablet to be administered along with Effexor, and if pt. continues 
to refuse administer ordered 5mg of IM. This writer educated pt. regarding this plan, and 
she sat up and began eating breakfast, ate 100%. Also, with much hesitation, she consented 
to taking all ordered AM medications, positive encouragement provided by this writer.

## 2020-08-25 NOTE — NUR
Reassessment: Pt with significant improvement in PO intake, although does continue to 
fluctuate. Pt documented with average 25% PO intake with occasional meal refusals, however 
up to 100% PO intake x 3 of the 4 most recent meals. Per RN notes pt initially refused 
breakfast this morning however then proceeded to consume 100% after encouragement. Pt 
receives all meals in her room d/t parana and refusing to go to the group room and 
continues to receive encouragement for fluid and food intake per RN notes. Pt now receiving 
Ensure Enlive TID as of 8/24, documented with 100% PO intake of ONS. Pt met nutrient needs 
yesterday and will continue to do so if trends in PO intake continue to improve. LBM 8/17 
documented as constipated, though refusing physical assessment. D/w dietary to send prunes 
and prune juice with next meal to assist with bowel regularity. Noted that pt continues to 
refuse medications at times. Will continue to follow and monitor need for further nutrition 
intervention.

Recommendations:

1) Continue regular diet

2) Encourage PO intake

3) Ensure Enlive TID

4) Routine bowel care

5) Scaled weights per rx

-------------------------------------------------------------------------------

Addendum: 08/25/20 at 1230 by Dariana Fuentes RD

-------------------------------------------------------------------------------

Amended: Links added.

## 2020-08-25 NOTE — NUR
Nursing Progress Note:



Legal hold: 5250



Client on involuntary status for GD



Report received from nurse with use of SBAR: YAMILKA Colón



Why are they here: This is a 65-year-old female who was brought to the Emergency Room via 
EMS. She was found in a pile of dirt and leaves on the ground. The patient is somewhat 
confused and having auditory hallucinations. The patient has a history of schizophrenia, 
homelessness, and was weak and exhausted as reported by the ER nursing staff. Patient was 
then admitted to the Neuro floor where she was treated for heat exhaustion, metabolic 
encephalopathy, hypokalemia, and head lice. A 5150 was placed for GD. On admission, patient 
is delusional, she explains to this writer that she has been excluded from the "[facility] 
[place]." Patient states she hears voices but will not elaborate on what they are saying. 



Assessment



What has happened this shift: Received pt. sleeping in bed curled up in a fetal position 
with covers over her head at the beginning of the shift, staff awoke her for breakfast. Pt. 
initially refused breakfast, however with continued encouragement from staff was able to eat 
100% of her tray (see previous note). She consented to V/S, however refused medications or 
physical assessment. Education provided to pt. regarding her Riese and she then consented to 
oral medications versus an injection (again, see previous note). Attempted to complete 1:1 
at bedside, however pt. continues to be guarded and will stare blankly without a response, 
or response will be delayed. When this writer questioned pt. regarding how she is feeling, 
she stated, "I'm just confused." She is A&O X1 to name only. Pt. does not respond when 
questioned regarding depression, anxiety, or A/V/HA, however she continues to appear 
somewhat internally preoccupied AEB distractibility and delayed responses. Pt. requests 
shoes and a mask, she is informed that there are no shoes available, but she is provided a 
mask. Pt. thanks this writer. She complies with attending lunch in the Group Room with 
encouragement, and is again able to eat 100% of the meal, including her Ensure. Pt. isolates 
in her room throughout the afternoon, sitting up in her chair at bedside. 

S/I, H/I: Unable to assess

A/VH: Continues to appear internally preoccupied AEB distractibility and significant delay 
in responses

Sleep: Sleep hours are 10.25, she naps intermittently throughout the day

ADL's: Requires direction and encouragement

Group attendance: No

Were meds taken: Yes, with  much encouragement took oral medications. Pt. is Riesed. 

Any med S/E: N/A





Mental Status Exam



Appearance: Somewhat disheveled, however appropriately dressed

Eye contact: Poor, avoidant, stares blankly

Behavior: Resistive to care, anxious, guarded, isolative, and paranoid

Speech: Pt. mumbles softly to herself, speech is soft

Mood: Paranoid and hypervigilant

Affect: Flat

Thought process: Poverty of thought with possible thought blocking

Thought Content: Paranoid delusions and internal preoccupation

Cognition: A&O X1

Insight: Poor

Judgment: Poor



Interventions



PRN's used: None

Therapeutic interventions: Maintained a safe and therapeutic environment, provided clear and 
simple instructions, attempted to orient to reality, encouraged ADLs and provided positive 
encouragement, provided medication education and education regarding current Riese, 
monitored/encouraged meal/fluid intake, encouraged eating in the Group Room, and maintained 
Q 15min safety checks.

Restraints/seclusion/emergency medication: N/A





Justification of Continued Inpatient Treatment: Per Dr. Ryo, pt. remains gravely disabled 
and continues to require food/fluid encouragement and encouragement to preform ADLs. She 
remains a high risk discharge. Pt. is now Riesed.

## 2020-08-26 VITALS — DIASTOLIC BLOOD PRESSURE: 70 MMHG | SYSTOLIC BLOOD PRESSURE: 115 MMHG

## 2020-08-26 RX ADMIN — Medication SCH CAN: at 13:05

## 2020-08-26 RX ADMIN — PALIPERIDONE SCH MG: 3 TABLET, EXTENDED RELEASE ORAL at 09:10

## 2020-08-26 RX ADMIN — VENLAFAXINE HYDROCHLORIDE SCH MG: 75 CAPSULE, EXTENDED RELEASE ORAL at 09:10

## 2020-08-26 RX ADMIN — Medication SCH CAN: at 17:49

## 2020-08-26 RX ADMIN — Medication SCH CAN: at 09:10

## 2020-08-26 RX ADMIN — PALIPERIDONE SCH MG: 3 TABLET, EXTENDED RELEASE ORAL at 20:10

## 2020-08-26 NOTE — NUR
RN went into patients room to make sure pt was going to get up and eat lunch, pt was already 
sitting at bedside eating her lunch.  Pt drank all her ensure and ate 80% of her lunch 
without any prompting.

## 2020-08-26 NOTE — NUR
Nursing Progress Note:



Legal hold: 5250



Client on involuntary status for GD



Report received from nurse with use of SBAR: YAMILKA Colón



Why are they here: This is a 65-year-old female who was brought to the Emergency Room via 
EMS. She was found in a pile of dirt and leaves on the ground. The patient is somewhat 
confused and having auditory hallucinations. The patient has a history of schizophrenia, 
homelessness, and was weak and exhausted as reported by the ER nursing staff. Patient was 
then admitted to the Neuro floor where she was treated for heat exhaustion, metabolic 
encephalopathy, hypokalemia, and head lice. A 5150 was placed for GD. On admission, patient 
is delusional, she explains to this writer that she has been excluded from the "[facility] 
[place]." Patient states she hears voices but will not elaborate on what they are saying. 



Assessment



What has happened this shift: Received pt. sleeping in bed curled up in a fetal position 
with covers over her head at the beginning of the shift, staff attempted to wake her for 
breakfast. Pt. initially refused breakfast, however with continued encouragement from staff 
finally sat up and ate 75% her tray, took her meds and let RN assess her. She consented to 
V/S as well. Attempted to complete 1:1 at bedside,  pt responded but with minmal words. When 
RN questioned  pt how she is feeling she said she was just tired. She is A&O X1 to name 
only. Pt. does not respond when questioned regarding depression, anxiety, or A/V/HA, however 
she continues to appear somewhat internally preoccupied. She received her lunch in her room 
and sat up and ate her 90% of her lunch without encouragment, including her Ensure. Pt. 
isolates in her room throughout the afternoon, sleeping in a curled up ball.

S/I, H/I: Unable to assess

A/VH: Continues to appear internally preoccupied AEB distractibility and delay in responses

Sleep: Sleep hours are 10.25, she naps intermittently throughout the day

ADL's: Requires some direction and encouragement

Group attendance: No

Were meds taken: Yes. Pt. is Riesed. 

Any med S/E: N/A





Mental Status Exam



Appearance: Somewhat disheveled, however appropriately dressed

Eye contact: Poor, avoidant, stares blankly

Behavior: Resistive to care, anxious, guarded, isolative, and paranoid

Speech:  speech is soft

Mood: Paranoid and hypervigilant

Affect: Flat

Thought process: Poverty of thought with possible thought blocking

Thought Content: Paranoid delusions and internal preoccupation

Cognition: A&O X1

Insight: Poor

Judgment: Poor



Interventions



PRN's used: None

Therapeutic interventions: Maintained a safe and therapeutic environment, provided clear and 
simple instructions, attempted to orient to reality, encouraged ADLs and provided positive 
encouragement, provided medication education and education regarding current Riese, 
monitored/encouraged meal/fluid intake, encouraged eating in the Group Room, and maintained 
Q 15min safety checks.

Restraints/seclusion/emergency medication: N/A





Justification of Continued Inpatient Treatment: Per Dr. Roy, pt. remains gravely disabled 
and continues to require food/fluid encouragement and encouragement to preform ADLs. She 
remains a high risk discharge. Pt. is now Riesed.

## 2020-08-26 NOTE — NUR
Nursing Progress Note:



Legal hold: 5250 for gravely disabled



Report received from Yifan PRATHER with use of SBAR



Why are they here: This is a 65-year-old female who was brought to the Emergency Room via 
EMS. She was found in a pile of dirt and leaves on the ground. The patient is somewhat 
confused and having auditory hallucinations. The patient has a history of schizophrenia, 
homelessness, and was weak and exhausted as reported by the ER nursing staff. Patient was 
then admitted to the Neuro floor where she was treated for heat exhaustion, metabolic 
encephalopathy, hypokalemia, and head lice. A 5150 was placed for GD.  











Assessment



What has happened this shift: The patient spent the majority of the shift curled up on the 
chair by her bed covered by a blanket even her head. She has made no attempt to engage with 
others. When approached for the evening assessment she was resistive to answering any of the 
assessment questions and frequently would give vague replies. Her speech is soft and 
monotone and her replies are minimal, slow and there is a response delay. Her affect is 
blunted but at times she appears worried and anxious. She was offered ativan for anxiety but 
she refused. She did take her HS medication but was resistive and stated that she had 
already taken medications earlier in the day. When asked if she would consider taking the 
medications after she left the hospital she replied, "It would depend on how I'm feeling" 
When asked about her mood she stated, "I feel a little down" When asked if she had attend 
any groups she replied "I wasn't aware that there were any groups" She is not able to 
verbalize a plan for food shelter or clothing and when asked she stated, "I don't know" When 
asked if she wanted to live there was a long response delay and she replied "I'm not sure" 
She denied medication side effects but then stated that she had been having some dizziness 
earlier in the day. She refused to allow her vital signs to be taken. Her behavior is 
catatonic like. She is not attending to her ADLs. She was asked if the staff here at [facility] 
could assist her in any way to help her make plans for when she does leave the hospital and 
she replied that the  was going to meet with her but she was unsure if she had. 
She was offered assistance to call her sister and she had another delay in her reply but 
then declined to call her sister. The patient was made aware of the nature of her hold.  Her 
insight and judgement seem to be poor. It is difficult to fully assess the patient because 
she is so resistive to questions. 







Justification of Continued Inpatient Treatment: The patient remains gravely disabled and is 
unable to verbalize a plan for food shelter or clothing. She is resistive to care. 
Medication adjustments being done to help stabilize the patient.

## 2020-08-26 NOTE — NUR
Pt has been sleeping most of days.  Pt did sit at side of bed and eat breakfast after much 
coaxing.

## 2020-08-26 NOTE — NUR
Nursing Progress Note



Legal hold: 5250 for gravely disabled



Report received from Roque PRATHER with use of SBAR



Why are they here: This is a 65-year-old female who was brought to the Emergency Room via 
EMS. She was found in a pile of dirt and leaves on the ground. The patient is somewhat 
confused and having auditory hallucinations. The patient has a history of schizophrenia, 
homelessness, and was weak and exhausted as reported by the ER nursing staff. Patient was 
then admitted to the Neuro floor where she was treated for heat exhaustion, metabolic 
encephalopathy, hypokalemia, and head lice. A 5150 was placed for GD.  





Assessment



What has happened this shift: The patient spent almost the entire evening curled up on a 
ball on the chair beside her bed and had herself completely covered up with a blanket. She 
makes no attempt to social with others. Her affect was flat. Her responses to the assessment 
questions were delayed, minimal, vague and guarded. She appears to be her stated age and is 
dressed appropriately in green scrubs. Her hair is very short after having her hair shaved 
2nd to an infestation of head lice. She was asked how her mood was and stated, "It's okay" 
She was unable to state the month but was able to state it was 2020. She stated when asked 
why she was here she replied, "I'm not sure why I'm here. I'm really not"  She then stated, 
"I collapsed in a field" but could not or would not elaborate. She was very hesitant to take 
her evening medications but did take them when reminded of the court order. She stated that 
she wanted to leave the inpatient unit and it was explained to her that she was on a 
psychiatric hold. When asked what her plan would be to provide for her food, shelter or 
clothing if she were discharged she replied, "I'm not sure I"m really not. I would just like 
to leave" She was unable to identify any source of support in the community. Suicidal and 
homicidal thoughts were not endorsed. Her insight and judgement are very poor. ADLs require 
prompting. Side effects to medications were not noted during the evening assessment. 







Justification of Continued Inpatient Treatment: The patient continues to be significantly 
gravely disabled and is unable to verbalize any kind of plan for self care if she were to 
leave the inpatient unit. She is being stabilized on medications and discharge plans require 
further assessment.

## 2020-08-26 NOTE — NUR
Pt sitting up in chair eating dinner. Pt asked if someone else needed the food but RN told 
pt it was for her to eat to keep her healthy.  Pt sat up and ate dinner.

## 2020-08-27 VITALS — DIASTOLIC BLOOD PRESSURE: 98 MMHG | SYSTOLIC BLOOD PRESSURE: 151 MMHG

## 2020-08-27 VITALS — DIASTOLIC BLOOD PRESSURE: 66 MMHG | SYSTOLIC BLOOD PRESSURE: 108 MMHG

## 2020-08-27 RX ADMIN — Medication SCH CAN: at 08:00

## 2020-08-27 RX ADMIN — Medication SCH CAN: at 18:00

## 2020-08-27 RX ADMIN — PALIPERIDONE SCH MG: 3 TABLET, EXTENDED RELEASE ORAL at 08:16

## 2020-08-27 RX ADMIN — Medication SCH CAN: at 13:00

## 2020-08-27 RX ADMIN — PALIPERIDONE SCH MG: 3 TABLET, EXTENDED RELEASE ORAL at 21:16

## 2020-08-27 RX ADMIN — VENLAFAXINE HYDROCHLORIDE SCH MG: 75 CAPSULE, EXTENDED RELEASE ORAL at 08:16

## 2020-08-27 NOTE — NUR
Nursing Progress Note:



Legal hold: 5250 Expires 9/3 



Client on involuntary status for GD



Report received from nurse with use of SBAR: YAMILKA Colón



Why are they here: This is a 65-year-old female who was brought to the Emergency Room via 
EMS. She was found in a pile of dirt and leaves on the ground. The patient is somewhat 
confused and having auditory hallucinations. The patient has a history of schizophrenia, 
homelessness, and was weak and exhausted as reported by the ER nursing staff. Patient was 
then admitted to the Neuro floor where she was treated for heat exhaustion, metabolic 
encephalopathy, hypokalemia, and head lice. A 5150 was placed for GD. On admission, patient 
is delusional, she explains to this writer that she has been excluded from the "[place] 
[place]." Patient states she hears voices but will not elaborate on what they are saying. 



Assessment



What has happened this shift: Received patient curled up in her chair at shift change, 
blankets pulled up over her head. Patient aroused to her name. Patient was cooperative with 
vitals, medication and 1:1 assessment. Patient refused her breakfast/Ensure even with 
encouragement,   I am just not hungry right now. Patient was asked if she would sleep in 
her bed with head in high fowlers position,  pt. stated yes and said Thank you. Patient 
later was observed in her bed with blanket pulled up over her head. Pt. responded correctly, 
but her response was delayed as if she needed to think about it;  to name,  and where she 
was. When asked if she remembered how she got here. Pt. remembers being in a field, but 
that was all.  Patient wasnt sure if she had a home or not I think I am confused or 
something. Pt. has a difficult time making decisions when given an option. Patient 
reassured she is safe and if she needs assistance with any issues there is staff available.  
Patient up for lunch sitting on edge of bed with 90% of meal consumed.  Patient remains in 
bed the entire shift, with blanket pulled over her head. Patient is encouraged to engage in 
unit. Refused her dinner tray, left it at bed side, encouraging patient to eat. 



S/I, H/I: Pt. states I am not sure. 

A/VH: Continues to appear internally preoccupied. 

Sleep: 8 hrs. per Sleep Assessment, she naps intermittently throughout the day

ADL's: Requires some direction and encouragement. 

Group attendance: No

Were meds taken: Yes. Pt. is Riesed. 

Any med S/E: Pt. denies, none observed. 





Mental Status Exam



Appearance: Slightly disheveled, buzzed hair, wearing green unit scrubs. 

Eye contact: Poor, avoidant, stares blankly

Behavior: Anxious, guarded, isolative, and paranoid

Speech:  Soft, quiet, latent, minimal

Mood: Paranoid, fatigued

Affect: Flat w/some brightening

Thought process: Poverty of thought with some thought blocking

Thought Content: Paranoid delusions and internal preoccupation

Cognition: A&O X3

Insight: Poor

Judgment: Poor



Interventions



PRN's used: None



Therapeutic interventions: Maintained a safe and therapeutic environment, provided clear and 
simple instructions, orient to reality, encouraged ADLs and provided positive encouragement, 
monitored/encouraged meal/fluid intake, encouraged eating in the Group Room, and maintained 
Q 15min safety checks.

Restraints/seclusion/emergency medication: N/A





Justification of Continued Inpatient Treatment: Per Dr. Roy, patient remains gravely 
disabled and continues to require food/fluid encouragement and encouragement to preform 
ADLs. She remains a high risk discharge. Patient is now Riesed.

## 2020-08-28 VITALS — SYSTOLIC BLOOD PRESSURE: 122 MMHG | DIASTOLIC BLOOD PRESSURE: 48 MMHG

## 2020-08-28 VITALS — DIASTOLIC BLOOD PRESSURE: 73 MMHG | SYSTOLIC BLOOD PRESSURE: 117 MMHG

## 2020-08-28 RX ADMIN — Medication SCH CAN: at 17:44

## 2020-08-28 RX ADMIN — Medication SCH CAN: at 13:59

## 2020-08-28 RX ADMIN — VENLAFAXINE HYDROCHLORIDE SCH MG: 75 CAPSULE, EXTENDED RELEASE ORAL at 08:51

## 2020-08-28 RX ADMIN — Medication SCH CAN: at 08:00

## 2020-08-28 RX ADMIN — PALIPERIDONE SCH MG: 3 TABLET, EXTENDED RELEASE ORAL at 20:58

## 2020-08-28 RX ADMIN — PALIPERIDONE SCH MG: 3 TABLET, EXTENDED RELEASE ORAL at 08:51

## 2020-08-28 NOTE — NUR
Nursing Progress Note:



Legal hold: 5250 Expires 9/3 



Client on involuntary status for GD



Report received from nurse with use of SBAR: YAMILKA Nevarez



Why are they here: This is a 65-year-old female who was brought to the Emergency Room via 
EMS. She was found in a pile of dirt and leaves on the ground. The patient is somewhat 
confused and having auditory hallucinations. The patient has a history of schizophrenia, 
homelessness, and was weak and exhausted as reported by the ER nursing staff. Patient was 
then admitted to the Neuro floor where she was treated for heat exhaustion, metabolic 
encephalopathy, hypokalemia, and head lice. A 5150 was placed for GD. On admission, patient 
is delusional, she explains to this writer that she has been excluded from the "[place] 
[place]." Patient states she hears voices but will not elaborate on what they are saying. 



Assessment



What has happened this shift: Received patient sleeping in high Fowlers position with 
blanket pulled completely over her head. Pt. roused to name and was compliant with vitals. 
Pt. continues to eat meals in room due to her paranoia. Pt. refused her breakfast again, but 
ate 90% of her lunch and 100% of her Ensure. Pt. was compliant with 1:1 assessment. Assessed 
the area between pts knees due to how pt. has been laying in a fetal style position for 
extended amount of time.  No redness or skin impairment noted. Last BM charted for pt. was 
8/17. Pt. reports uncertainty of when last BM was. Abdomen is soft, non-tender upon 
palpation with hypoactive bowel sounds. Pt. declined MOM. Pt is on iron supplement 325 mg 
BID. This writer was able to get patient to walk the unit with her. Patient continues to be 
paranoid and was hypervigilant during ambulation. Pt. stopped activity when there were too 
many people behind her, once they passed the walk continued. Pt. then returned to her bed 
and covered her head up. Pt. reports AH hearing voices a little I think. 



** ENCOURAGE PT. TO WALK WITH NURSE OR TECH. **



S/I, H/I: I dont think so. 

A/VH: +AH a little I think. Unsure of VH.

Sleep: 6.75 hrs. per Sleep Assessment. Stays in bed all of shift, naps intermittently. 

ADL's: Requires some direction and encouragement. 

Group attendance: No

Were meds taken: Yes, without incident.  Pt. is Riesed. 

Any med S/E: None observed or reported. 





Mental Status Exam



Appearance: Slightly disheveled, buzzed hair, wearing green unit scrubs. 

Eye contact: Poor, avoidant, slight improvement. 

Behavior: Remains isolative to bed. Appears confused at times and paranoid. Pt was up with 
RN and walked unit x1. Becoming slightly more verbal. 

Speech:  Soft, quiet, latent, minimal

Mood: Paranoid, fatigued

Affect: Flat w/some brightening

Thought process: Poverty of thought with some thought blocking

Thought Content: Paranoid delusions and internal preoccupation

Cognition: A&O X3

Insight: Poor

Judgment: Poor



Interventions



PRN's used: None



Therapeutic interventions: Maintained a safe and therapeutic environment, provided clear and 
simple instructions, orient to reality, encouraged ADLs and provided positive encouragement, 
monitored/encouraged meal/fluid intake, encouraged eating in the Group Room, and maintained 
Q 15min safety checks.



Restraints/seclusion/emergency medication: N/A





Justification of Continued Inpatient Treatment: Per Dr. Roy, patient remains gravely 
disabled and continues to require food/fluid encouragement and encouragement to preform 
ADLs. She remains a high risk discharge. Patient is now Riesed.

## 2020-08-28 NOTE — NUR
Nursing Progress Note:



Legal hold: 5250 for gravely disabled



Report received from LIBAN Case with use of SBAR



Why are they here: This is a 65-year-old female who was brought to the Emergency Room via 
EMS. She was found in a pile of dirt and leaves on the ground. The patient is somewhat 
confused and having auditory hallucinations. The patient has a history of schizophrenia, 
homelessness, and was weak and exhausted as reported by the ER nursing staff. Patient was 
then admitted to the Neuro floor where she was treated for heat exhaustion, metabolic 
encephalopathy, hypokalemia, and head lice. A 5150 was placed for GD.  





Assessment



What has happened this shift:  

Pt was in her room, in her bed covered from head to toe in her blanket in a fetal position 
during shift change.   Appears to be in no distress, respirations are even and unlabored.   
She did eat about 50% of her dinner, did not eat her ensure drink.  She spent most of the 
evening curled up in her bed covered up in her blanket.  Pt was woken up with some 
difficulty and she appears irritable and paranoid.  Pt took her HS medication with some 
encouragement and returned back to sleep.  Asked pt if it was ok to perform physical 
assessment and states maybe a little later.  Attempted again but pt refused.  When asked 
about anxiety pt denies and states Im okay, just want to go back to sleep.  Her bed was 
in a high fowlers position and asked pt if she wanted it lower since she appeared to be in 
an uncomfortable position but she refused saying she was fine like that.  Interaction with 
pt was minimal and does not make attempt to socialize with other patients.  She was not 
observed out of her room.  



S/I, H/I: Unable to assess

A/VH: Denies but appears to be responding to internal stimuli 

Sleep: Currently sleeping, see sleep assessment for total hours 

ADL's: Requires constant encouragement and prompting.  

Group attendance: None during night shift 

Were meds taken: Yes

Any med S/E: None noted or reported 





Mental Status Exam



Appearance: Somewhat disheveled, wearing green unit scrubs 

Eye contact: Poor, very minimal 

Behavior: Resistive to care, paranoid, isolative, sleeps for most of the shift 

Speech: Soft, minimal, delayed responses 

Mood: Guarded and paranoid, appears frightened  

Affect: Flat

Thought process: Poverty of thought

Thought Content: Paranoid delusions and internal preoccupation

Cognition: Alert and oriented X2

Insight: Poor

Judgment: Poor







Interventions



PRN's used: None

Therapeutic interventions: Maintained a safe and therapeutic environment, provided clear and 
simple instructions, attempted to orient to reality, encouraged ADLs and provided positive 
encouragement, provided medication education and monitored/encouraged meal/fluid intake, 
educated regarding Riese, and maintained Q 15min safety checks.

Restraints/seclusion/emergency medication: N/A





Justification of Continued Inpatient Treatment: The patient remains gravely disabled and is 
unable to verbalize a plan for food shelter or clothing. She is resistive to care. 
Medication adjustments being done to help stabilize the patient.

## 2020-08-28 NOTE — NUR
Patient called this writer by name as I was walking by her room and asked if she could take 
a shower. Patient showered and linens were changed. Will encourage patient to eat dinner in 
group room.

## 2020-08-29 VITALS — SYSTOLIC BLOOD PRESSURE: 105 MMHG | DIASTOLIC BLOOD PRESSURE: 62 MMHG

## 2020-08-29 VITALS — SYSTOLIC BLOOD PRESSURE: 100 MMHG | DIASTOLIC BLOOD PRESSURE: 55 MMHG

## 2020-08-29 RX ADMIN — Medication SCH CAN: at 17:36

## 2020-08-29 RX ADMIN — VENLAFAXINE HYDROCHLORIDE SCH MG: 75 CAPSULE, EXTENDED RELEASE ORAL at 08:00

## 2020-08-29 RX ADMIN — Medication SCH CAN: at 13:11

## 2020-08-29 RX ADMIN — Medication SCH CAN: at 08:00

## 2020-08-29 RX ADMIN — PALIPERIDONE SCH MG: 3 TABLET, EXTENDED RELEASE ORAL at 20:48

## 2020-08-29 RX ADMIN — PALIPERIDONE SCH MG: 3 TABLET, EXTENDED RELEASE ORAL at 08:00

## 2020-08-29 NOTE — NUR
Nursing Progress Note:



Legal hold: 5250 Expires 9/3 



Client on involuntary status for GD



Report received from nurse with use of SBAR: YAMILKA Nevarez



Why are they here: This is a 65-year-old female who was brought to the Emergency Room via 
EMS. She was found in a pile of dirt and leaves on the ground. The patient is somewhat 
confused and having auditory hallucinations. The patient has a history of schizophrenia, 
homelessness, and was weak and exhausted as reported by the ER nursing staff. Patient was 
then admitted to the Neuro floor where she was treated for heat exhaustion, metabolic 
encephalopathy, hypokalemia, and head lice. A 5150 was placed for GD. On admission, patient 
is delusional, she explains to this writer that she has been excluded from the "[facility] [place]." Patient states she hears voices but will not elaborate on what they are saying. 



Assessment



What has happened this shift: Received patient sleeping in bed at shift change. Pt. 
continues to lay in a fetal position with blankets pulled up over her head. Pt. is compliant 
with vitals and medication. Pt. usually doesnt eat her breakfast, but has been eating 
% of lunch and dinner. Patient ambulated the unit twice with this writer, continues to 
be paranoid and looking over her shoulder. This writer spoke with Dr. Roy about pt. not 
having bowel movements, he is okay with continuing to monitor. Pt. was administered prune 
juice with her meals. Pts fluid and food intake is increasing eating and she is increasing 
her ambulation. Encouraged pt. eat in group room, pt. states she is not ready.





** ENCOURAGED PT. TO WALK WITH NURSE OR TECH. **



S/I, H/I: I dont think so. 

A/VH: Denies today, I am okay. 

Sleep: 10.25 hrs. per Sleep Assessment. Stays in bed all of shift, naps intermittently. 

ADL's: Requires some direction and encouragement. 

Group attendance: No

Were meds taken: Yes, without incident.  Pt. is Riesed. 

Any med S/E: None observed or reported. 





Mental Status Exam



Appearance: Slightly disheveled, buzzed hair, wearing green unit scrubs. 

Eye contact: Poor, avoidant, slight improvement. 

Behavior: Remains isolative to bed. Appears confused at times and paranoid. Pt was up with 
RN and walked unit x2. Becoming more verbal. 

Speech:  Soft, quiet, latent, minimal

Mood: Paranoid, fatigued

Affect: Flat w/some brightening

Thought process: Poverty of thought with some thought blocking

Thought Content: Paranoid delusions and internal preoccupation

Cognition: A&O X3

Insight: Poor

Judgment: Poor



Interventions



PRN's used: None



Therapeutic interventions: Maintained a safe and therapeutic environment, provided clear and 
simple instructions, orient to reality, encouraged ADLs and provided positive encouragement, 
monitored/encouraged meal/fluid intake, encouraged eating in the Group Room, and maintained 
Q 15min safety checks.



Restraints/seclusion/emergency medication: N/A





Justification of Continued Inpatient Treatment: Per Dr. Roy, patient remains gravely 
disabled and continues to require food/fluid encouragement and encouragement to preform 
ADLs. She remains a high risk discharge. Patient is now Riesed.

## 2020-08-29 NOTE — NUR
Nursing Progress Note:



Legal hold: 5250 for gravely disabled



Report received from LIBAN Case with use of SBAR



Why are they here: This is a 65-year-old female who was brought to the Emergency Room via 
EMS. She was found in a pile of dirt and leaves on the ground. The patient is somewhat 
confused and having auditory hallucinations. The patient has a history of schizophrenia, 
homelessness, and was weak and exhausted as reported by the ER nursing staff. Patient was 
then admitted to the Neuro floor where she was treated for heat exhaustion, metabolic 
encephalopathy, hypokalemia, and head lice. A 5150 was placed for GD.  





Assessment



What has happened this shift:  

Pt was in her rom during shift change.  She was awake, curled up in her blanket, also 
wearing a face mask the whole time.  She states that she is doing good, just tired.  She was 
woken up for her medication which she took without any issues.  She however still appears 
irritable and engages minimally with this RN.  When asked any question she answers Im 
fine.  Encouraged pt to come out for snack but she refused.  Pt still appears paranoid and 
somewhat frightened.  Pt also refused her Ensure drink.  Pt denies any A/VH, depression or 
anxiety, but she does appear to be responding to internal stimuli.  Pt remained isolative to 
her room for the entire evening and did not request help from staff.   



S/I, H/I: Unable to assess

A/VH: Denies but appears to be responding to internal stimuli 

Sleep: Currently sleeping, see sleep assessment for total hours 

ADL's: Requires constant encouragement and prompting.  

Group attendance: None during night shift 

Were meds taken: Yes

Any med S/E: None noted or reported 





Mental Status Exam



Appearance: Somewhat disheveled, wearing green unit scrubs 

Eye contact: Poor, very minimal 

Behavior: Resistive to care, paranoid, isolative, sleeps for most of the shift 

Speech: Soft, minimal, delayed responses 

Mood: Withdrawn, sleepy, irritable 

Affect: Flat

Thought process: Poverty of thought

Thought Content: Paranoid delusions 

Cognition: Alert and oriented X2

Insight: Poor

Judgment: Poor







Interventions



PRN's used: None

Therapeutic interventions: Maintained a safe and therapeutic environment, provided clear and 
simple instructions, attempted to orient to reality, encouraged ADLs and provided positive 
encouragement, provided medication education and monitored/encouraged meal/fluid intake, 
educated regarding Riese, and maintained Q 15min safety checks.

Restraints/seclusion/emergency medication: N/A





Justification of Continued Inpatient Treatment: The patient remains gravely disabled and is 
unable to verbalize a plan for food shelter or clothing. She is resistive to care. 
Medication adjustments being done to help stabilize the patient.

## 2020-08-29 NOTE — NUR
Nursing Progress Note:



Legal hold: 5250 Expires 9/3 



Client on involuntary status for GD



Report received from nurse with use of SBAR: YAMILKA Nevarez



Why are they here: This is a 65-year-old female who was brought to the Emergency Room via 
EMS. She was found in a pile of dirt and leaves on the ground. The patient is somewhat 
confused and having auditory hallucinations. The patient has a history of schizophrenia, 
homelessness, and was weak and exhausted as reported by the ER nursing staff. Patient was 
then admitted to the Neuro floor where she was treated for heat exhaustion, metabolic 
encephalopathy, hypokalemia, and head lice. A 5150 was placed for GD. On admission, patient 
is delusional, she explains to this writer that she has been excluded from the "[facility]." Patient states she hears voices but will not elaborate on what they are saying. 



Assessment



What has happened this shift: Received patient sleeping in bed at shift change. Pt. 
continues to lay in a fetal position with blankets pulled up over her head. Pt. is compliant 
with vitals and medication. Pt. usually doesnt eat her breakfast, but has been eating 
% of lunch and dinner. Patient ambulated the unit twice with this writer, continues to 
be paranoid and looking over her shoulder. This writer spoke with Dr. Roy about pt. not 
having bowel movements, he is okay with continuing to monitor. Pts fluid and food intake is 
increasing eating and she is increasing her ambulation. Encouraged pt. eat in group room, 
pt. states she is not ready.

** ENCOURAGE PT. TO WALK WITH NURSE OR TECH. **



S/I, H/I: I dont think so. 

A/VH: Denies today, I am okay. 

Sleep: 10.25 hrs. per Sleep Assessment. Stays in bed all of shift, naps intermittently. 

ADL's: Requires some direction and encouragement. 

Group attendance: No

Were meds taken: Yes, without incident.  Pt. is Riesed. 

Any med S/E: None observed or reported. 





Mental Status Exam



Appearance: Slightly disheveled, buzzed hair, wearing green unit scrubs. 

Eye contact: Poor, avoidant, slight improvement. 

Behavior: Remains isolative to bed. Appears confused at times and paranoid. Pt was up with 
RN and walked unit x2. Becoming more verbal. 

Speech:  Soft, quiet, latent, minimal

Mood: Paranoid, fatigued

Affect: Flat w/some brightening

Thought process: Poverty of thought with some thought blocking

Thought Content: Paranoid delusions and internal preoccupation

Cognition: A&O X3

Insight: Poor

Judgment: Poor



Interventions



PRN's used: None



Therapeutic interventions: Maintained a safe and therapeutic environment, provided clear and 
simple instructions, orient to reality, encouraged ADLs and provided positive encouragement, 
monitored/encouraged meal/fluid intake, encouraged eating in the Group Room, and maintained 
Q 15min safety checks.



Restraints/seclusion/emergency medication: N/A





Justification of Continued Inpatient Treatment: Per Dr. Roy, patient remains gravely 
disabled and continues to require food/fluid encouragement and encouragement to preform 
ADLs. She remains a high risk discharge. Patient is now Riesed.

## 2020-08-29 NOTE — NUR
Reassessment: Patient's PO intake continues to fluctuate though averages % with 
occasional refusal of meals. Per RN notes pt continues to eat meals in her room r/t 
karla. Pt receiving Ensure Enlive TID, documented with 100% PO intake of ONS x 6 however 
has refused x 8 times. Recommend continuing with Ensure Enlive given good acceptance at 
times. LBM 8/17 though unsure of when patient's true LBM was per physical assessment as pt 
documented to be A/O x 2 and confused. Pt refused MoM 8/28 per RN notes. D/w dietary to send 
prunes and prune juice with dinner tonight to assist with bowel regularity. Will continue to 
follow and monitor need for further nutrition intervention.

Recommendations:

1) Continue regular diet

2) Encourage PO intake

3) Ensure Enlive TID

4) Routine bowel care

5) Scaled weights per rx

-------------------------------------------------------------------------------

Addendum: 08/29/20 at 1537 by Dariana Fuentes RD

-------------------------------------------------------------------------------

Amended: Links added.

## 2020-08-30 VITALS — DIASTOLIC BLOOD PRESSURE: 62 MMHG | SYSTOLIC BLOOD PRESSURE: 109 MMHG

## 2020-08-30 VITALS — DIASTOLIC BLOOD PRESSURE: 75 MMHG | SYSTOLIC BLOOD PRESSURE: 111 MMHG

## 2020-08-30 RX ADMIN — Medication SCH CAN: at 08:00

## 2020-08-30 RX ADMIN — Medication SCH CAN: at 13:44

## 2020-08-30 RX ADMIN — PALIPERIDONE SCH MG: 3 TABLET, EXTENDED RELEASE ORAL at 20:26

## 2020-08-30 RX ADMIN — Medication SCH CAN: at 18:01

## 2020-08-30 RX ADMIN — VENLAFAXINE HYDROCHLORIDE SCH MG: 75 CAPSULE, EXTENDED RELEASE ORAL at 08:44

## 2020-08-30 RX ADMIN — PALIPERIDONE SCH MG: 3 TABLET, EXTENDED RELEASE ORAL at 08:44

## 2020-08-30 NOTE — NUR
Nursing Progress Note:



Legal hold: 5250



Client on involuntary status for GD



Report received from nurse with use of SBAR: AYMILKA Case



Why are they here: This is a 65-year-old female who was brought to the Emergency Room via 
EMS. She was found in a pile of dirt and leaves on the ground. The patient is somewhat 
confused and having auditory hallucinations. The patient has a history of schizophrenia, 
homelessness, and was weak and exhausted as reported by the ER nursing staff. Patient was 
then admitted to the Neuro floor where she was treated for heat exhaustion, metabolic 
encephalopathy, hypokalemia, and head lice. A 5150 was placed for GD. On admission, patient 
is delusional, she explains to this writer that she has been excluded from the "[facility]." Patient states she hears voices but will not elaborate on what they are saying. 



Assessment



What has happened this shift: Patient continues to isolate to her room in fetal position in 
her chair with a blanket pulled up and over her head. She was med compliant this shift 
refused to answer questions and returned to sleep.

S/I, H/I: Denies

A/VH: Denies, however continues to appear internally preoccupied AEB distractibility and 
ongoing paranoia

Sleep: Sleep hours are 8.75, she naps intermittently throughout the shift

ADL's: Requires direction and encouragement

Group attendance: N/A

Were meds taken: Yes

Any med S/E: N/A





Mental Status Exam



Appearance: Somewhat disheveled, however appropriately dressed

Eye contact: Avoidant or will stare blankly

Behavior: Cooperative, anxious, guarded, isolative, and paranoid

Speech: Remains soft, and pt. will answers questions with short 1-2 word responses only and 
appears distracted. However, speech is now more spontaneous without a delay in response as 
previously exhibited. 

Mood: Paranoid and hypervigilant

Affect: Flat

Thought process: Poverty of thought with possible thought blocking

Thought Content: Paranoid delusions and internal preoccupation

Cognition: A&O X1

Insight: Poor

Judgment: Poor



Interventions



PRN's used: None

Therapeutic interventions: Maintained a safe and therapeutic environment, provided clear and 
simple instructions, attempted to orient to reality, encouraged ADLs and provided positive 
encouragement, provided medication education, monitored/encouraged meal/fluid intake, 
encouraged eating in the Group Room and ambulation on the unit, and maintained Q 15min 
safety checks.

Restraints/seclusion/emergency medication: N/A





Justification of Continued Inpatient Treatment: Per Dr. Roy, pt. remains gravely disabled 
and continues to require medication adjustments.

## 2020-08-30 NOTE — NUR
Nursing Progress Note:



Legal hold: 5250



Client on involuntary status for GD



Report received from nurse with use of SBAR: Alesia Churchill RN



Why are they here: This is a 65-year-old female who was brought to the Emergency Room via 
EMS. She was found in a pile of dirt and leaves on the ground. The patient is somewhat 
confused and having auditory hallucinations. The patient has a history of schizophrenia, 
homelessness, and was weak and exhausted as reported by the ER nursing staff. Patient was 
then admitted to the Neuro floor where she was treated for heat exhaustion, metabolic 
encephalopathy, hypokalemia, and head lice. A 5150 was placed for GD. On admission, patient 
is delusional, she explains to this writer that she has been excluded from the "[facility] [place]." Patient states she hears voices but will not elaborate on what they are saying. 



Assessment



What has happened this shift: Received pt. sleeping in bed curled up in a fetal position 
with covers over her head at the beginning of the shift, this writer awoke her for 
breakfast, but she refused. However, pt. was compliant with V/S and physical assessment. She 
was slightly hesitant to take AM medications after they were reviewed with her by this 
writer, pt. stated, "I haven't' rinsed my mouth yet." Pt's toothbrush and toothpaste handed 
were handed to her, and she was encouraged to use them by this writer, she complied and then 
took medications. Attempted to complete 1:1 at bedside, however pt. continues present as 
paranoid and guarded. Speech remains soft, and pt. will answers questions with short 1-2 
word responses only and appears distracted. However, speech is now more spontaneous without 
a delay in response as previously exhibited. Pt. answers most questions with, "I'm okay."  
She denies all MH s/s, however appears to be somewhat internally preoccupied AEB ongoing 
distractibility. Pt. continues to isolate in bed throughout the day and refuses lunch. but 
does accept snack. After lunch, pt. gets up and pace the hallway several times and then 
returns to her room sitting in chair at bedside. This writer will continue to encourage the 
performance of ADLs and monitor fluid and food intake. 

S/I, H/I: Denies

A/VH: Denies, however continues to appear internally preoccupied AEB distractibility and 
ongoing paranoia

Sleep: Sleep hours are 8.75, she naps intermittently throughout the shift

ADL's: Requires direction and encouragement

Group attendance: N/A

Were meds taken: Yes

Any med S/E: N/A





Mental Status Exam



Appearance: Somewhat disheveled, however appropriately dressed

Eye contact: Avoidant or will stare blankly

Behavior: Cooperative, anxious, guarded, isolative, and paranoid

Speech: Remains soft, and pt. will answers questions with short 1-2 word responses only and 
appears distracted. However, speech is now more spontaneous without a delay in response as 
previously exhibited. 

Mood: Paranoid and hypervigilant

Affect: Flat

Thought process: Poverty of thought with possible thought blocking

Thought Content: Paranoid delusions and internal preoccupation

Cognition: A&O X1

Insight: Poor

Judgment: Poor



Interventions



PRN's used: None

Therapeutic interventions: Maintained a safe and therapeutic environment, provided clear and 
simple instructions, attempted to orient to reality, encouraged ADLs and provided positive 
encouragement, provided medication education, monitored/encouraged meal/fluid intake, 
encouraged eating in the Group Room and ambulation on the unit, and maintained Q 15min 
safety checks.

Restraints/seclusion/emergency medication: N/A





Justification of Continued Inpatient Treatment: Per Dr. Roy, pt. remains gravely disabled 
and continues to require medication adjustments.

## 2020-08-30 NOTE — NUR
Nursing Progress Note:



Legal hold: 5250 for gravely disabled



Report received from LIBAN Case with use of SBAR



Why are they here: This is a 65-year-old female who was brought to the Emergency Room via 
EMS. She was found in a pile of dirt and leaves on the ground. The patient is somewhat 
confused and having auditory hallucinations. The patient has a history of schizophrenia, 
homelessness, and was weak and exhausted as reported by the ER nursing staff. Patient was 
then admitted to the Neuro floor where she was treated for heat exhaustion, metabolic 
encephalopathy, hypokalemia, and head lice. A 5150 was placed for GD.  





Assessment



What has happened this shift:  

Pt was in her room awake during shift change.  When asked how she was doing, she doesnt 
respond.  Asked if she was doing fine and she takes a some time to respond but eventually 
shakes her head.  When asked what was bothering, she has a hard time answering but appears 
frightening and very depressed.  Eventually she responds never mind, Im fine.  Encouraged 
pt to drink her Ensure drink but she refused.  She did however ate most of her dinner.  Also 
encouraged her to walk and ambulate but she also refused.  Late on pt was observed out of 
her room ambulating without difficulty.  When this writer approached pt and asked if she 
needed anything she asked for a snack.  A peanut butter and jelly sandwich and juice was 
provided.  During 1:1 physical assessment pt appeared to be more willing to comply, although 
still answering questions minimally.  When asked if she had liked the peanut butter and 
jelly sandwich she said yes and thanked this RN with a big smile.   She retired to sleep and 
was not observed out of her room.    



S/I, H/I: Denies 

A/VH: Denies 

Sleep: Currently sleeping, see sleep assessment for total hours 

ADL's: Requires constant encouragement and prompting.  

Group attendance: None during night shift 

Were meds taken: Yes

Any med S/E: None noted or reported 





Mental Status Exam



Appearance: Somewhat disheveled, wearing green unit scrubs 

Eye contact: Poor, very minimal 

Behavior: More cooperative today, less paranoid  

Speech: Soft, minimal, delayed responses 

Mood: Depressed, fatigued

Affect: Flat, very little brightening 

Thought process: Poverty of thought

Thought Content: Paranoid delusions 

Cognition: Alert and oriented X2

Insight: Poor

Judgment: Poor







Interventions



PRN's used: None

Therapeutic interventions: Maintained a safe and therapeutic environment, provided clear and 
simple instructions, attempted to orient to reality, encouraged ADLs and provided positive 
encouragement, provided medication education and monitored/encouraged meal/fluid intake, 
educated regarding Riese, and maintained Q 15min safety checks.

Restraints/seclusion/emergency medication: N/A





Justification of Continued Inpatient Treatment: The patient remains gravely disabled and is 
unable to verbalize a plan for food shelter or clothing. She is resistive to care. 
Medication adjustments being done to help stabilize the patient.

## 2020-08-31 VITALS — DIASTOLIC BLOOD PRESSURE: 76 MMHG | SYSTOLIC BLOOD PRESSURE: 117 MMHG

## 2020-08-31 VITALS — DIASTOLIC BLOOD PRESSURE: 74 MMHG | SYSTOLIC BLOOD PRESSURE: 122 MMHG

## 2020-08-31 RX ADMIN — Medication SCH CAN: at 13:13

## 2020-08-31 RX ADMIN — VENLAFAXINE HYDROCHLORIDE SCH MG: 75 CAPSULE, EXTENDED RELEASE ORAL at 08:44

## 2020-08-31 RX ADMIN — Medication SCH CAN: at 08:44

## 2020-08-31 RX ADMIN — DOCUSATE SODIUM SCH MG: 100 CAPSULE, LIQUID FILLED ORAL at 20:19

## 2020-08-31 RX ADMIN — PALIPERIDONE SCH MG: 3 TABLET, EXTENDED RELEASE ORAL at 08:44

## 2020-08-31 RX ADMIN — PALIPERIDONE SCH MG: 3 TABLET, EXTENDED RELEASE ORAL at 20:18

## 2020-08-31 RX ADMIN — Medication SCH CAN: at 18:15

## 2020-08-31 NOTE — NUR
Nursing Progress Note:



Legal hold: 5250



Client on involuntary status for GD



Report received from nurse with use of SBAR: Yifan RN



Why are they here: This is a 65-year-old female who was brought to the Emergency Room via 
EMS. She was found in a pile of dirt and leaves on the ground. The patient is somewhat 
confused and having auditory hallucinations. The patient has a history of schizophrenia, 
homelessness, and was weak and exhausted as reported by the ER nursing staff. Patient was 
then admitted to the Neuro floor where she was treated for heat exhaustion, metabolic 
encephalopathy, hypokalemia, and head lice. A 5150 was placed for GD. On admission, patient 
is delusional, she explains to this writer that she has been excluded from the "[place] 
[place]." Patient states she hears voices but will not elaborate on what they are saying. 



Assessment



What has happened this shift: Patient continues to isolate to her room. She was med 
compliant this shift and ate a PBJ and milk for snack. Asked about bowl movement she replied 
had one today. Pt sleeping in bed this shift.

S/I, H/I: Denies

A/VH: Denies, however continues to appear internally preoccupied AEB distractibility and 
ongoing paranoia

Sleep: Sleep hours are 10, she naps intermittently throughout the shift

ADL's: Requires direction and encouragement

Group attendance: No

Were meds taken: Yes

Any med S/E: N/A





Mental Status Exam



Appearance: Somewhat disheveled, however appropriately dressed

Eye contact: Avoidant or will stare blankly

Behavior: Cooperative, anxious, guarded, isolative, and paranoid

Speech: Remains soft, and pt. will answers questions with short 1-2 word responses only and 
appears distracted. However, speech is now more spontaneous without a delay in response as 
previously exhibited. 

Mood: Paranoid and hypervigilant

Affect: Flat

Thought process: Poverty of thought with possible thought blocking

Thought Content: Paranoid delusions and internal preoccupation

Cognition: A&O X2 (not to time or reason here)

Insight: Poor

Judgment: Poor



Interventions



PRN's used: None

Therapeutic interventions: Maintained a safe and therapeutic environment, provided clear and 
simple instructions, attempted to orient to reality, encouraged ADLs and provided positive 
encouragement, provided medication education, monitored/encouraged meal/fluid intake, 
encouraged eating in the Group Room and ambulation on the unit, monitored for bowel movement 
and obtained orders for (KUB, Mirlax, and Colace), and maintained Q 15min safety checks.

Restraints/seclusion/emergency medication: N/A





Justification of Continued Inpatient Treatment: Per Dr. Roy, pt. remains gravely disabled 
and continues to require medication adjustments.

## 2020-08-31 NOTE — NUR
Nursing Progress Note:



Legal hold: 5250



Client on involuntary status for GD



Report received from nurse with use of SBAR: YAMILKA Nevarez



Why are they here: This is a 65-year-old female who was brought to the Emergency Room via 
EMS. She was found in a pile of dirt and leaves on the ground. The patient is somewhat 
confused and having auditory hallucinations. The patient has a history of schizophrenia, 
homelessness, and was weak and exhausted as reported by the ER nursing staff. Patient was 
then admitted to the Neuro floor where she was treated for heat exhaustion, metabolic 
encephalopathy, hypokalemia, and head lice. A 5150 was placed for GD. On admission, patient 
is delusional, she explains to this writer that she has been excluded from the "[place] 
[place]." Patient states she hears voices but will not elaborate on what they are saying. 



Assessment



What has happened this shift: Received pt. sleeping in bed curled up in a fetal position 
with covers over her head at the beginning of the shift, she was awoken for breakfast, but 
again refused to eat. Pt. again was compliant with physical assessment and V/S. She was also 
compliant with AM medications, and did not exhibit any hesitation with taking them as she 
had previously. 1:1 at bedside, pt. continues present as paranoid and guarded, however 
speech is clear and she did comply with answering most of this writer's questions.  Pt. 
continues to answer questions with short 1-2 word responses only, and continues to appear 
somewhat distracted. However, speech is now more spontaneous without a delay in response as 
previously exhibited. Pt. is A&O X2, she is unaware of the time/date or reason she is here. 
Pt. states, "I was down in a field. I fell and I was too weak to get up." She continues to 
deny all MH s/s. Pt. continues to isolate in bed throughout the day, however she does eat 
lunch and consume ordered Ensure. This writer continues to encourage the performance of ADLs 
and monitor fluid and food intake, however pt. refuses to ambulate in the hallways during 
this shift and isolates in her bed throughout the day.

Pt. continued to be unable  to have a bowl movement during the beginning of the shift, and 
bowl sounds are hypoactive. This writer consulted with Dr. Roy who ordered a KUB. KUB 
showed fecal obstipation, however no bowl obstruction. Dr. Roy ordered Miralax, and pt. was 
able to consume. He also ordered a Glycerine Suppository PRN, however unavailable per 
pharmacy. Dr. Roy made aware, and new orders for 100mg of Colace now, and 200mg BID. This 
writer went to administer Colace, however pt. reported that she was able to have a medium 
formed bowl movement and refused medication despite education provided. Will endorse to Noc 
shift and continue to monitor. 

S/I, H/I: Denies

A/VH: Denies, however continues to appear internally preoccupied AEB distractibility and 
ongoing paranoia

Sleep: Sleep hours are 10, she naps intermittently throughout the shift

ADL's: Requires direction and encouragement

Group attendance: No

Were meds taken: Yes

Any med S/E: N/A





Mental Status Exam



Appearance: Somewhat disheveled, however appropriately dressed

Eye contact: Avoidant or will stare blankly

Behavior: Cooperative, anxious, guarded, isolative, and paranoid

Speech: Remains soft, and pt. will answers questions with short 1-2 word responses only and 
appears distracted. However, speech is now more spontaneous without a delay in response as 
previously exhibited. 

Mood: Paranoid and hypervigilant

Affect: Flat

Thought process: Poverty of thought with possible thought blocking

Thought Content: Paranoid delusions and internal preoccupation

Cognition: A&O X2 (not to time or reason here)

Insight: Poor

Judgment: Poor



Interventions



PRN's used: None

Therapeutic interventions: Maintained a safe and therapeutic environment, provided clear and 
simple instructions, attempted to orient to reality, encouraged ADLs and provided positive 
encouragement, provided medication education, monitored/encouraged meal/fluid intake, 
encouraged eating in the Group Room and ambulation on the unit, monitored for bowel movement 
and obtained orders for (KUB, Mirlax, and Colace), and maintained Q 15min safety checks.

Restraints/seclusion/emergency medication: N/A





Justification of Continued Inpatient Treatment: Per Dr. Roy, pt. remains gravely disabled 
and continues to require medication adjustments.

## 2020-09-01 VITALS — SYSTOLIC BLOOD PRESSURE: 89 MMHG | DIASTOLIC BLOOD PRESSURE: 59 MMHG

## 2020-09-01 VITALS — SYSTOLIC BLOOD PRESSURE: 88 MMHG | DIASTOLIC BLOOD PRESSURE: 51 MMHG

## 2020-09-01 VITALS — DIASTOLIC BLOOD PRESSURE: 80 MMHG | SYSTOLIC BLOOD PRESSURE: 105 MMHG

## 2020-09-01 RX ADMIN — PALIPERIDONE SCH MG: 3 TABLET, EXTENDED RELEASE ORAL at 09:27

## 2020-09-01 RX ADMIN — Medication SCH CAN: at 09:39

## 2020-09-01 RX ADMIN — POLYETHYLENE GLYCOL 3350 SCH GM: 17 POWDER, FOR SOLUTION ORAL at 08:00

## 2020-09-01 RX ADMIN — DOCUSATE SODIUM SCH MG: 100 CAPSULE, LIQUID FILLED ORAL at 09:26

## 2020-09-01 RX ADMIN — Medication SCH CAN: at 08:00

## 2020-09-01 RX ADMIN — Medication SCH CAN: at 13:05

## 2020-09-01 RX ADMIN — DOCUSATE SODIUM SCH MG: 100 CAPSULE, LIQUID FILLED ORAL at 20:34

## 2020-09-01 RX ADMIN — VENLAFAXINE HYDROCHLORIDE SCH MG: 75 CAPSULE, EXTENDED RELEASE ORAL at 09:27

## 2020-09-01 RX ADMIN — Medication SCH CAN: at 18:04

## 2020-09-01 RX ADMIN — PALIPERIDONE SCH MG: 3 TABLET, EXTENDED RELEASE ORAL at 20:34

## 2020-09-01 NOTE — NUR
Nursing Progress Note:



Legal hold: 5250



Client on involuntary status for GD



Report received from nurse with use of SBAR: YAMILKA Colón



Why are they here: This is a 65-year-old female who was brought to the Emergency Room via 
EMS. She was found in a pile of dirt and leaves on the ground. The patient is somewhat 
confused and having auditory hallucinations. The patient has a history of schizophrenia, 
homelessness, and was weak and exhausted as reported by the ER nursing staff. Patient was 
then admitted to the Neuro floor where she was treated for heat exhaustion, metabolic 
encephalopathy, hypokalemia, and head lice. A 5150 was placed for GD. On admission, patient 
is delusional, she explains to this writer that she has been excluded from the "[place] 
[place]." Patient states she hears voices but will not elaborate on what they are saying. 



Assessment



What has happened this shift: Received pt. sleeping in bed again curled up in a fetal 
position with covers over her head at the beginning of the shift. Again staff attempted to 
awaken pt. for breakfast, however she continues to refuse this meal, she did accept lunch 
and dinner. Pt. again was compliant with physical assessment and V/S. She was also compliant 
with AM medications, however refused Miralax despite education, Dr. Roy aware (pt. did 
accept Colace). Attempted to complete 1:1 at bedside, however pt. continues present as 
paranoid and guarded. Pt. answers questions with minimal responses and continues to appear 
somewhat distracted. She denies all MH s/s, and states in a disregarding way, "I'm okay." 
This writer continues to encourage performance of ADLs and monitor fluid and food intake, 
however pt. again refuses to ambulate in the hallways during this shift and isolates in her 
bed throughout the day.

S/I, H/I: Denies

A/VH: Denies, however continues to appear internally preoccupied AEB distractibility and 
ongoing paranoia

Sleep: Sleep hours are 9.25, she naps intermittently throughout the shift

ADL's: Requires direction and encouragement

Group attendance: No

Were meds taken: Yes (refused Miralax)

Any med S/E: N/A





Mental Status Exam



Appearance: Somewhat disheveled, however appropriately dressed

Eye contact: Avoidant, will leave covers over her head at times

Behavior: Cooperative, anxious, guarded, isolative, and paranoid

Speech: Remains soft, and pt. will answers questions with short 1-2 word responses only 

Mood: Paranoid and hypervigilant

Affect: Flat

Thought process: Poverty of thought with possible thought blocking

Thought Content: Paranoid delusions and internal preoccupation

Cognition: A&O X2 (not to time or reason here)

Insight: Poor

Judgment: Poor



Interventions



PRN's used: None

Therapeutic interventions: Maintained a safe and therapeutic environment, provided clear and 
simple instructions, attempted to orient to reality, encouraged ADLs and provided positive 
encouragement, provided medication education, monitored/encouraged meal/fluid intake, 
encouraged eating in the Group Room and ambulation on the unit, and maintained Q 15min 
safety checks.

Restraints/seclusion/emergency medication: N/A





Justification of Continued Inpatient Treatment: Per Dr. Roy, pt. remains gravely disabled 
and continues to require coaching to perform ADLs, medication adjustments, and a safe and 
therapeutic environment. 

-------------------------------------------------------------------------------

Addendum: 09/01/20 at 1827 by Charissa Mathews RN

-------------------------------------------------------------------------------

Pt. walked the hallway a few laps at the end of the day with staff after much encouragement.

## 2020-09-02 VITALS — SYSTOLIC BLOOD PRESSURE: 96 MMHG | DIASTOLIC BLOOD PRESSURE: 56 MMHG

## 2020-09-02 VITALS — SYSTOLIC BLOOD PRESSURE: 110 MMHG | DIASTOLIC BLOOD PRESSURE: 58 MMHG

## 2020-09-02 RX ADMIN — PALIPERIDONE SCH MG: 3 TABLET, EXTENDED RELEASE ORAL at 20:39

## 2020-09-02 RX ADMIN — DOCUSATE SODIUM SCH MG: 100 CAPSULE, LIQUID FILLED ORAL at 07:55

## 2020-09-02 RX ADMIN — POLYETHYLENE GLYCOL 3350 SCH GM: 17 POWDER, FOR SOLUTION ORAL at 07:55

## 2020-09-02 RX ADMIN — Medication SCH CAN: at 13:11

## 2020-09-02 RX ADMIN — PALIPERIDONE SCH MG: 3 TABLET, EXTENDED RELEASE ORAL at 07:55

## 2020-09-02 RX ADMIN — Medication SCH CAN: at 08:30

## 2020-09-02 RX ADMIN — DOCUSATE SODIUM SCH MG: 100 CAPSULE, LIQUID FILLED ORAL at 20:38

## 2020-09-02 RX ADMIN — VENLAFAXINE HYDROCHLORIDE SCH MG: 75 CAPSULE, EXTENDED RELEASE ORAL at 07:55

## 2020-09-02 RX ADMIN — Medication SCH CAN: at 18:00

## 2020-09-02 NOTE — NUR
Nursing Progress Note:



Legal hold: 5250



Client on involuntary status for GD



Report received from nurse with use of SBAR: Yifan RN



Why are they here: This is a 65-year-old female who was brought to the Emergency Room via 
EMS. She was found in a pile of dirt and leaves on the ground. The patient is somewhat 
confused and having auditory hallucinations. The patient has a history of schizophrenia, 
homelessness, and was weak and exhausted as reported by the ER nursing staff. Patient was 
then admitted to the Neuro floor where she was treated for heat exhaustion, metabolic 
encephalopathy, hypokalemia, and head lice. A 5150 was placed for GD. On admission, patient 
is delusional, she explains to this writer that she has been excluded from the "[facility] 
[place]." Patient states she hears voices but will not elaborate on what they are saying. 



Assessment



What has happened this shift: Pt continues to isolate to her room in bed with blankets over 
her head. Pt was med compliant this shift but did not participate with interview. She only 
stated I'm fine leave me alone. Pt is paranoid and denies all MH symptoms.

S/I, H/I: Denies

A/VH: Denies, however continues to appear internally preoccupied AEB distractibility and 
ongoing paranoia

Sleep: Sleep hours are 9.25, she naps intermittently throughout the shift

ADL's: Requires direction and encouragement

Group attendance: No

Were meds taken: Yes (refused Miralax)

Any med S/E: N/A





Mental Status Exam



Appearance: Somewhat disheveled, however appropriately dressed

Eye contact: Avoidant, will leave covers over her head at times

Behavior: Cooperative, anxious, guarded, isolative, and paranoid

Speech: Remains soft, and pt. will answers questions with short 1-2 word responses only 

Mood: Paranoid and hypervigilant

Affect: Flat

Thought process: Poverty of thought with possible thought blocking

Thought Content: Paranoid delusions and internal preoccupation

Cognition: A&O X2 (not to time or reason here)

Insight: Poor

Judgment: Poor



Interventions



PRN's used: None

Therapeutic interventions: Maintained a safe and therapeutic environment, provided clear and 
simple instructions, attempted to orient to reality, encouraged ADLs and provided positive 
encouragement, provided medication education, monitored/encouraged meal/fluid intake, 
encouraged eating in the Group Room and ambulation on the unit, and maintained Q 15min 
safety checks.

Restraints/seclusion/emergency medication: N/A





Justification of Continued Inpatient Treatment: Per Dr. Roy, pt. remains gravely disabled 
and continues to require coaching to perform ADLs, medication adjustments, and a safe and 
therapeutic environment. 

-------------------------------------------------------------------------------

Addendum: 09/01/20 at 1827 by Charissa Mathews RN

-------------------------------------------------------------------------------

Pt. walked the hallway a few laps at the end of the day with staff after much encouragement.

## 2020-09-02 NOTE — NUR
Nursing Progress Note: Breanne Franz



Legal hold: 5250



Client on involuntary status for GD



Report received from YAMILKA Saha with use of SBAR: YAMILKA Colón



Why are they here:

 This is a 65-year-old female who was brought to the Emergency Room via EMS. She was found 
in a pile of dirt and leaves on the ground. The patient is somewhat confused and having 
auditory hallucinations. The patient has a history of schizophrenia, homelessness, and was 
weak and exhausted as reported by the ER nursing staff. Patient was then admitted to the 
Neuro floor where she was treated for heat exhaustion, metabolic encephalopathy, 
hypokalemia, and head lice. A 5150 was placed for GD. On admission, patient is delusional, 
she explains to this writer that she has been excluded from the "[facility] [place]." Patient 
states she hears voices but will not elaborate on what they are saying. 



Assessment:

What happened this shift:

Patient isolated in her room this shift. She has mostly slept, she has covered herself with 
a blanket. Patient has been drinking water this shift, she refused her Ensure. Patient is 
medication. The patient answers questions easier than past shifts when this writer cared for 
her. The patient smiles on occasion. Paranoia is less. Patient appears to be somewhat more 
agreeable to conversation. 

 

S/I, H/I: Denies.

A/VH: Patient denies.

Sleep: Napping frequently, will tally in am. 

ADL's: Requires direction and encouragement.

Group attendance: None on night shift. 

Were meds taken: Yes, medication compliant. 

Any med S/E: None noted. 





Mental Status Exam



Appearance: Somewhat disheveled, however appropriately dressed.

Eye contact: Good.

Behavior: Cooperative, anxious, guarded, isolative, and paranoid.

Speech: Quiet speech, regular rhythm.  

Mood: Some paranoia, quiet, sleepy.

Affect: Flat.

Thought process: Poverty of thought with possible thought blocking.

Thought Content:Unable to assess.

Cognition: A&O X2, (not to time or reason here.)

Insight: Poor.

Judgment: Poor.



Interventions



PRN's used: None



Therapeutic interventions: Maintained a safe and therapeutic environment, provided clear and 
simple instructions, attempted to orient to reality, encouraged ADLs and provided positive 
encouragement, provided medication education, monitored/encouraged meal/fluid intake, 
encouraged eating in the Group Room and ambulation on the unit, and maintained Q 15min 
safety checks.

Restraints/seclusion/emergency medication: N/A

## 2020-09-02 NOTE — NUR
CM



Presenting Issues: 

Pt shows signs of improvement (coming out of her room at least 1x/day, answering nurses' 
questions with single word answers). However, when SS approach her she continues to request 
that SS do not come to close to her, or talk to her, pt will curl up in a fetal position in 
her bed and cease verbal communication w/SS.  This has been going on since her admission, 
pt's 5250 will  on 9/3. 



Interventions: 

SS consulted w/attending physician re his plan for pt as she does not appear to be ready for 
discharge. Per consultation, attending physician will provide 5270 and consider medication 
adjustments. SS informed attending physician that a DCP cannot be formulated if pt is unable 
to participate in a portion of the planning process. 



Plan: 

SS will begin check-in w/pt 3x/week to hopefully de-sensitize pt to having some contact w/SS 
while she's here.  Check-in will start out at 30-seconds/visit during first week with SS 
standing at the door and saying hello, if pt is able to tolerate SS being at the door during 
first week; second week SS will enter the room and say hello... this will continue until pt 
is able to tolerate SS being in close proximity to her and having brief discussions.  SS 
will provide pt with a letter explaining the intervention, benefits and possible drawbacks 
of the intervention to provide informed consent and allow pt the opportunity to decide for 
herself if she wants to participate in the intervention. 

Katalina Zacarias LCSW


-------------------------------------------------------------------------------

Addendum: 20 at 0842 by Katalina HERNANDEZ

-------------------------------------------------------------------------------

Amended: Links added.

## 2020-09-02 NOTE — NUR
Nursing Progress Note: Maria M



Legal hold: 5250



Client on involuntary status for GD



Report received from nurse with use of SBAR: YAMILKA Colón



Why are they here:

 This is a 65-year-old female who was brought to the Emergency Room via EMS. She was found 
in a pile of dirt and leaves on the ground. The patient is somewhat confused and having 
auditory hallucinations. The patient has a history of schizophrenia, homelessness, and was 
weak and exhausted as reported by the ER nursing staff. Patient was then admitted to the 
Neuro floor where she was treated for heat exhaustion, metabolic encephalopathy, 
hypokalemia, and head lice. A 5150 was placed for GD. On admission, patient is delusional, 
she explains to this writer that she has been excluded from the "[facility] [place]." Patient 
states she hears voices but will not elaborate on what they are saying. 



Assessment:

What happened this shift:

Client was in bed with covers pulled up to begin this shift. She woke easily for assessment 
as well as medications. Client was urged to consume more water as directed by Dr. Roy. She 
experienced episodes of dizziness yesterday and dehydration may be an issue. Techs were also 
given the information to increase PO fluids. Client has tended to isolate herself in her 
room today but has been cordial with staff interventions. Client is taking more fluids PO.

 

S/I, H/I: Denies

A/VH: Denies, however continues to appear internally preoccupied AEB distractibility and 
ongoing paranoia

Sleep: 

ADL's: Requires direction and encouragement

Group attendance: No

Were meds taken: Yes 

Any med S/E: N/A





Mental Status Exam



Appearance: Somewhat disheveled, however appropriately dressed

Eye contact: Avoidant, will leave covers over her head at times

Behavior: Cooperative, anxious, guarded, isolative, and paranoid

Speech: Remains soft, and pt. will answers questions with short 1-2 word responses only 

Mood: Paranoid 

Affect: Flat

Thought process: Poverty of thought with possible thought blocking

Thought Content: Paranoid delusions and internal preoccupation

Cognition: A&O X2 (not to time or reason here)

Insight: Poor

Judgment: Poor



Interventions



PRN's used: None

Therapeutic interventions: Maintained a safe and therapeutic environment, provided clear and 
simple instructions, attempted to orient to reality, encouraged ADLs and provided positive 
encouragement, provided medication education, monitored/encouraged meal/fluid intake, 
encouraged eating in the Group Room and ambulation on the unit, and maintained Q 15min 
safety checks.

Restraints/seclusion/emergency medication: N/A

## 2020-09-03 VITALS — SYSTOLIC BLOOD PRESSURE: 111 MMHG | DIASTOLIC BLOOD PRESSURE: 73 MMHG

## 2020-09-03 VITALS — DIASTOLIC BLOOD PRESSURE: 75 MMHG | SYSTOLIC BLOOD PRESSURE: 126 MMHG

## 2020-09-03 RX ADMIN — DOCUSATE SODIUM SCH MG: 100 CAPSULE, LIQUID FILLED ORAL at 20:55

## 2020-09-03 RX ADMIN — Medication SCH CAN: at 08:19

## 2020-09-03 RX ADMIN — PALIPERIDONE SCH MG: 3 TABLET, EXTENDED RELEASE ORAL at 20:54

## 2020-09-03 RX ADMIN — DOCUSATE SODIUM SCH MG: 100 CAPSULE, LIQUID FILLED ORAL at 07:57

## 2020-09-03 RX ADMIN — PALIPERIDONE SCH MG: 3 TABLET, EXTENDED RELEASE ORAL at 07:57

## 2020-09-03 RX ADMIN — VENLAFAXINE HYDROCHLORIDE SCH MG: 75 CAPSULE, EXTENDED RELEASE ORAL at 07:57

## 2020-09-03 RX ADMIN — Medication SCH CAN: at 13:06

## 2020-09-03 RX ADMIN — POLYETHYLENE GLYCOL 3350 SCH GM: 17 POWDER, FOR SOLUTION ORAL at 07:58

## 2020-09-03 RX ADMIN — Medication SCH CAN: at 18:17

## 2020-09-03 NOTE — NUR
Nursing Progress Note: Maria M



Legal hold: 5270 Expires 10/3.



Client on involuntary status for GD



Report received from nurse with use of SBAR: YAMILKA Colón



Why are they here:     This is a 65-year-old female who was brought to the Emergency Room 
via EMS. She was found in a pile of dirt and leaves on the ground. The patient is somewhat 
confused and having auditory hallucinations. The patient has a history of schizophrenia, 
homelessness, and was weak and exhausted as reported by the ER nursing staff. Patient was 
then admitted to the Neuro floor where she was treated for heat exhaustion, metabolic 
encephalopathy, hypokalemia, and head lice. A 5150 was placed for GD. On admission, patient 
is delusional, she explains to this writer that she has been excluded from the "[facility] 
[facility]." Patient states she hears voices but will not elaborate on what they are saying. 



Assessment:



What happened this shift: Received client in bed in her usual position, with covers pulled 
up over her head and knees to chest. Patient rouses to name and is cooperative with vitals. 
Pt ate all of her breakfast, but the oatmeal and 100% of her lunch. Pt requested a shower 
and assisted with linen change by taking linen off bed.  When asked about SI /AH/VH pt 
states I am okay, they are just thoughts.  Patient becomes very guarded when asked about 
her past I just dont  have anything to say.  Pt continues to endorse some paranoia but 
states not as much. Ambulated with this writer 3x this shift. Pt did not appear as 
paranoid as in the past walks AEB not continuing looking over her shoulder and she increased 
her pace. Pt is more conversational and brightens during conversation. No skin impairment 
noted on assessment. Pt  was served her 5270 without incident. Encouraged pt to eat meals in 
group room, maybe next time. Noted trace edema on bilateral LE's.  Noted small abrasion 
over left eye from pt rubbing.  Last recorded bowel movement was 8/31. Due to recent KUB 
results (8/31). Administered 1/2 bottle Mag Citrate with effect. 



Pt. was served her 5270 today. Pt. had no response to how she felt about it. 



Total fluid intake: As of this writing approximately 2,000 mL 





 

S/I, H/I: Denies both. 

A/VH: I am okay, they are just thoughts. 

Sleep: 8.75 hrs. Per Sleep Assessment. Intermittent naps throughout shift. Pt isolates to 
bed. 

ADL's: Requires direction and encouragement. Showered today. 

Group attendance: No

Were meds taken: Yes, without incident. 

Any med S/E: None observed or reported. 





Mental Status Exam



Appearance: Clean, pt. Showered today. Short, buzzed hair, wearing green unit scrubs.

Eye contact: Fair, pulls covers down for this writer. 

Behavior: Cooperative, a little anxious, isolative. Increasing conversation. 

Speech: Soft, clear, conversing more.  

Mood: A little sad, a little anxious. Mood is improving. 

Affect: Blunted with brightening

Thought process: Poverty of thought with possible thought blocking

Thought Content: Situational, asking for a newspaper and reading glasses. 

Cognition: A&O X3 (reason here). 

Insight: Poor

Judgment: Poor



Interventions



PRN's used: None



Therapeutic interventions: Maintained a safe and therapeutic environment, provided clear and 
simple instructions, orient to reality, encouraged ADLs and provided positive encouragement, 
provided medication education, monitored/encouraged meal/fluid intake, encouraged eating in 
the Group Room and ambulation on the unit, and maintained Q 15 min safety checks.



Restraints/seclusion/emergency medication: N/A



Justification of Continued Inpatient Treatment: Per Dr. Roy, pt. remains gravely disabled 
and continues to require coaching to perform ADLs, medication adjustments, and a safe and 
therapeutic environment.

## 2020-09-04 VITALS — SYSTOLIC BLOOD PRESSURE: 99 MMHG | DIASTOLIC BLOOD PRESSURE: 68 MMHG

## 2020-09-04 VITALS — DIASTOLIC BLOOD PRESSURE: 71 MMHG | SYSTOLIC BLOOD PRESSURE: 100 MMHG

## 2020-09-04 RX ADMIN — Medication SCH CAN: at 17:16

## 2020-09-04 RX ADMIN — DOCUSATE SODIUM SCH MG: 100 CAPSULE, LIQUID FILLED ORAL at 08:13

## 2020-09-04 RX ADMIN — VENLAFAXINE HYDROCHLORIDE SCH MG: 75 CAPSULE, EXTENDED RELEASE ORAL at 08:13

## 2020-09-04 RX ADMIN — POLYETHYLENE GLYCOL 3350 SCH GM: 17 POWDER, FOR SOLUTION ORAL at 08:12

## 2020-09-04 RX ADMIN — PALIPERIDONE SCH MG: 3 TABLET, EXTENDED RELEASE ORAL at 21:11

## 2020-09-04 RX ADMIN — DOCUSATE SODIUM SCH MG: 100 CAPSULE, LIQUID FILLED ORAL at 21:12

## 2020-09-04 RX ADMIN — Medication SCH CAN: at 13:00

## 2020-09-04 RX ADMIN — PALIPERIDONE SCH MG: 3 TABLET, EXTENDED RELEASE ORAL at 08:12

## 2020-09-04 RX ADMIN — Medication SCH CAN: at 08:18

## 2020-09-04 NOTE — NUR
Nursing Progress Note: Maria M



Legal hold: 5270 Expires 10/3.



Client on involuntary status for GD



Report received from nurse with use of SBAR: YAMILKA Fang



Why are they here:     This is a 65-year-old female who was brought to the Emergency Room 
via EMS. She was found in a pile of dirt and leaves on the ground. The patient is somewhat 
confused and having auditory hallucinations. The patient has a history of schizophrenia, 
homelessness, and was weak and exhausted as reported by the ER nursing staff. Patient was 
then admitted to the Neuro floor where she was treated for heat exhaustion, metabolic 
encephalopathy, hypokalemia, and head lice. A 5150 was placed for GD. On admission, patient 
is delusional, she explains to this writer that she has been excluded from the "[facility] 
[facility]." Patient states she hears voices but will not elaborate on what they are saying. 



Assessment:



What happened this shift: Received patient lying in bed in her usual fetal position with 
blankets pulled up over her head. Pt is compliant with care and each day mood and affect are 
improving. Pt declines Ensure because she doesnt like the taste. Pt denies AH, but appears 
at times to be internally preoccupied. Pt does not get up on her own to ambulate unless she 
is encouraged. Pt went to the patio break and on the way back became dizzy, after sitting 
for a while pt reports feeling better. Pt ate lunch up in her chair and will attempt to have 
her eat dinner in the group room. Pt up ambulating in her room with her roommate. 



Total fluid intake as of this writing 1822 mL. Pt ate AM and PM snack. 100% of breakfast and 
lunch. 





 

S/I, H/I: Denies both. 

A/VH: I am okay, they are just thoughts. 

Sleep: 8.75 hrs. Per Sleep Assessment. Intermittent naps throughout shift. Pt isolates to 
room. 

ADL's: Requires direction and encouragement. 

Group attendance: No

Were meds taken: Yes, without incident. 

Any med S/E: None observed or reported. 





Mental Status Exam



Appearance: Clean, short, buzzed hair, wearing green unit scrubs.

Eye contact: Fair, pulls covers down for this writer. 

Behavior: Cooperative, a little anxious, isolative. Increasing conversation. Up sitting in 
chair. 

Speech: Soft, clear, conversing more.  

Mood: A little sad, a little anxious. Mood is improving. 

Affect: Continues to improve - more brightening. 

Thought process: Poverty of thought with possible thought blocking

Thought Content: Situational.  

Cognition: A&O X3 (reason here). 

Insight: Poor

Judgment: Poor



Interventions



PRN's used: None



Therapeutic interventions: Maintained a safe and therapeutic environment, provided clear and 
simple instructions, orient to reality, encouraged ADLs and provided positive 
encouragement, provided medication education, monitored/encouraged meal/fluid intake, 
encouraged eating in the Group Room and ambulation on the unit, and maintained Q 15 min 
safety checks.



Restraints/seclusion/emergency medication: N/A



Justification of Continued Inpatient Treatment: Per Dr. Roy, pt. remains gravely disabled 
and continues to require coaching to perform ADLs, medication adjustments, and a safe and 
therapeutic environment.

## 2020-09-04 NOTE — NUR
Reassessment: Pt with significant increase in PO intake, averaging % with only two 
meal refusals since last RD assessment (8/29). PO intake of ONS slowly declined from 100% 
down to 50% with 0% x 5 most recent ONS. Okay to discontinue ONS if pt to continue averaging 
% PO intake of meals as that is meeting patient's estimated nutrient needs. LBM 8/31, 
pt recently started on routine bowel care, received PRN MoM 8/31, and a one time dose of Mag 
Citrate 9/3. No nutrition intervention implemented at this time. Will continue to follow.

Recommendations:

1) Continue regular diet

2) Encourage PO intake

3) Ensure Enlive TID; okay to discontinue if pt continues to refuse ONS and consume average 
75% PO intake of meals

4) Routine bowel care

5) Scaled weights per rx

-------------------------------------------------------------------------------

Addendum: 09/04/20 at 0902 by Dariana Fuentes RD

-------------------------------------------------------------------------------

Amended: Links added.

## 2020-09-04 NOTE — NUR
Nursing Progress Note: Breanne Franz



Legal hold: 5250



Client on involuntary status for GD



Report received from YAMILKA Nevarez with use of SBAR: YAMILKA Colón



Why are they here:

 This is a 65-year-old female who was brought to the Emergency Room via EMS. She was found 
in a pile of dirt and leaves on the ground. The patient is somewhat confused and having 
auditory hallucinations. The patient has a history of schizophrenia, homelessness, and was 
weak and exhausted as reported by the ER nursing staff. Patient was then admitted to the 
Neuro floor where she was treated for heat exhaustion, metabolic encephalopathy, 
hypokalemia, and head lice. A 5150 was placed for GD. On admission, patient is delusional, 
she explains to this writer that she has been excluded from the "[facility] Patient 
states she hears voices but will not elaborate on what they are saying. 



Assessment:

What happened this shift:

Patient is resting in her room as has been her standard and practice. Patient is fairly well 
oriented today. She responds in a timely fashion to questions. Patient converses well. On 
occasion this patient smiles. Patient still declines to discuss her personal history, recent 
events, etc. Patient denies S/I or H/I. Patient tells this writer she is starting to feel 
ok. Patient is taking on more water and other PO fluids. Patient declines to drink her 
Ensure. When departing the patient room this writer offers to turn the light off, the 
patient states "I've got it, she rapidly turns in bed and reaches for the light switch. This 
is improved functioning compared to the recent week. No paranoia evident this shift. Patient 
is medication compliant. Patient states she had a normal BM during the day.

 

S/I, H/I: Denies.

A/VH: Patient denies.

Sleep: Napping frequently, will tally in am. 

ADL's: Requires direction and encouragement.

Group attendance: None on night shift. 

Were meds taken: Yes, medication compliant. 

Any med S/E: None noted. 





Mental Status Exam



Appearance: Somewhat disheveled, however appropriately dressed.

Eye contact: Good.

Behavior: Cooperative, anxious, guarded, isolative, and paranoid.

Speech: Quiet speech, regular rhythm.  

Mood: Some paranoia, quiet, sleepy.

Affect: Flat.

Thought process: Poverty of thought with possible thought blocking.

Thought Content:Unable to assess.

Cognition: Patient oriented to person, place, and time. Not to whole situation. 

Insight: Poor.

Judgment: Poor.



Interventions



PRN's used: None



Therapeutic interventions: Maintained a safe and therapeutic environment, provided clear and 
simple instructions, attempted to orient to reality, encouraged ADLs and provided positive 
encouragement, provided medication education, monitored/encouraged meal/fluid intake, 
encouraged eating in the Group Room and ambulation on the unit, and maintained Q 15min 
safety checks.

Restraints/seclusion/emergency medication: N/A


-------------------------------------------------------------------------------

Addendum: 09/04/20 at 0243 by Javier Zavala RN

-------------------------------------------------------------------------------

Disregard this note. See next note, minor correction.

## 2020-09-04 NOTE — NUR
Nursing Progress Note: Breanne Franz



Legal hold: 5250



Client on involuntary status for GD



Report received from YAMILKA Nevarez with use of SBAR



Why are they here:

 This is a 65-year-old female who was brought to the Emergency Room via EMS. She was found 
in a pile of dirt and leaves on the ground. The patient is somewhat confused and having 
auditory hallucinations. The patient has a history of schizophrenia, homelessness, and was 
weak and exhausted as reported by the ER nursing staff. Patient was then admitted to the 
Neuro floor where she was treated for heat exhaustion, metabolic encephalopathy, 
hypokalemia, and head lice. A 5150 was placed for GD. On admission, patient is delusional, 
she explains to this writer that she has been excluded from the "[facility]." Patient 
states she hears voices but will not elaborate on what they are saying. 



Assessment:

What happened this shift:

Patient is resting in her room as has been her standard and practice. Patient is fairly well 
oriented today. She responds in a timely fashion to questions. Patient converses well. On 
occasion this patient smiles. Patient still declines to discuss her personal history, recent 
events, etc. Patient denies S/I or H/I. Patient tells this writer she is starting to feel 
ok. Patient is taking on more water and other PO fluids. Patient declines to drink her 
Ensure. When departing the patient room this writer offers to turn the light off, the 
patient states "I've got it, she rapidly turns in bed and reaches for the light switch. This 
is improved functioning compared to the recent week. No paranoia evident this shift. Patient 
is medication compliant. Patient states she had a normal BM during the day.

 

S/I, H/I: Denies.

A/VH: Patient denies.

Sleep: Napping frequently, will tally in am. 

ADL's: Requires direction and encouragement.

Group attendance: None on night shift. 

Were meds taken: Yes, medication compliant. 

Any med S/E: None noted. 





Mental Status Exam



Appearance: Somewhat disheveled, however appropriately dressed.

Eye contact: Good.

Behavior: Cooperative, anxious, guarded, isolative, and paranoid.

Speech: Quiet speech, regular rhythm.  

Mood: Some paranoia, quiet, sleepy.

Affect: Flat.

Thought process: Poverty of thought with possible thought blocking.

Thought Content:Unable to assess.

Cognition: Patient oriented to person, place, and time. Not to whole situation. 

Insight: Poor.

Judgment: Poor.



Interventions



PRN's used: None



Therapeutic interventions: Maintained a safe and therapeutic environment, provided clear and 
simple instructions, attempted to orient to reality, encouraged ADLs and provided positive 
encouragement, provided medication education, monitored/encouraged meal/fluid intake, 
encouraged eating in the Group Room and ambulation on the unit, and maintained Q 15min 
safety checks.

Restraints/seclusion/emergency medication: N/A

## 2020-09-05 VITALS — DIASTOLIC BLOOD PRESSURE: 74 MMHG | SYSTOLIC BLOOD PRESSURE: 108 MMHG

## 2020-09-05 VITALS — DIASTOLIC BLOOD PRESSURE: 79 MMHG | SYSTOLIC BLOOD PRESSURE: 120 MMHG

## 2020-09-05 RX ADMIN — DOCUSATE SODIUM SCH MG: 100 CAPSULE, LIQUID FILLED ORAL at 20:15

## 2020-09-05 RX ADMIN — PALIPERIDONE SCH MG: 3 TABLET, EXTENDED RELEASE ORAL at 08:32

## 2020-09-05 RX ADMIN — DOCUSATE SODIUM SCH MG: 100 CAPSULE, LIQUID FILLED ORAL at 08:37

## 2020-09-05 RX ADMIN — POLYETHYLENE GLYCOL 3350 SCH GM: 17 POWDER, FOR SOLUTION ORAL at 08:33

## 2020-09-05 RX ADMIN — Medication SCH CAN: at 08:00

## 2020-09-05 RX ADMIN — PALIPERIDONE SCH MG: 3 TABLET, EXTENDED RELEASE ORAL at 20:15

## 2020-09-05 RX ADMIN — Medication SCH CAN: at 13:00

## 2020-09-05 RX ADMIN — VENLAFAXINE HYDROCHLORIDE SCH MG: 75 CAPSULE, EXTENDED RELEASE ORAL at 08:32

## 2020-09-05 RX ADMIN — Medication SCH CAN: at 18:18

## 2020-09-05 NOTE — NUR
Nursing Progress Note: 



Legal hold: 5250



Client on involuntary status for GD



Report received from YAMILKA Fang with use of SBAR



Why are they here:

 This is a 65-year-old female who was brought to the Emergency Room via EMS. She was found 
in a pile of dirt and leaves on the ground. The patient is somewhat confused and having 
auditory hallucinations. The patient has a history of schizophrenia, homelessness, and was 
weak and exhausted as reported by the ER nursing staff. Patient was then admitted to the 
Neuro floor where she was treated for heat exhaustion, metabolic encephalopathy, 
hypokalemia, and head lice. A 5150 was placed for GD. On admission, patient is delusional, 
she explains to this writer that she has been excluded from the "[facility]." Patient 
states she hears voices but will not elaborate on what they are saying. 



Assessment:

What happened this shift: Pt is isolative to room. She will respond to questions with 
minimal answers but pleasantly. She did not eat breakfast and stated she did not want lunch. 
When RN asked about this she states Im just not hungry, its ok. She later was seen 
eating her lunch and she did drink both ensure shakes (from breakfast and lunch). She nods 
her head no when asked about MH symptoms. She napped off and on through the day. She 
smiled at RN and was kind and quiet. She spent most of the day in her bed or chair, but then 
sat in a chair and looked out the window when her room mate was discharged. She attended 
dinner in the dining room and ate her full meal.   She was engaging with RN while she took 
dinner medications.  



S/I, H/I: Pt Denies.

A/VH: Pt denies.

Sleep: 9 h per sleep assessment 

ADL's: Independent.

Group attendance: No. 

Were meds taken: Yes. 

Any med S/E: None noted. 





Mental Status Exam



Appearance: Disheveled 

Eye contact: Direct

Behavior: Guarded, kind, isolative, cooperative. 

Speech: Soft, minimal.  

Mood: Guarded

Affect: Flat.

Thought process: Unable to assess.

Thought Content: Unable to assess.

Cognition: Alert

Insight: Poor.

Judgment: Poor.



Interventions



PRN's used: none.



Therapeutic interventions: Maintained a safe and therapeutic environment, provided clear and 
simple instructions, attempted to orient to reality, encouraged ADLs and provided positive 
encouragement, provided medication education, monitored/encouraged meal/fluid intake, 
encouraged eating in the Group Room and ambulation on the unit, and maintained Q 15min 
safety checks.

Restraints/seclusion/emergency medication: N/A

## 2020-09-05 NOTE — NUR
Nursing Progress Note: Breanne Franz



Legal hold: 5250



Client on involuntary status for GD



Report received from YAMILKA Nevarez with use of SBAR



Why are they here:

 This is a 65-year-old female who was brought to the Emergency Room via EMS. She was found 
in a pile of dirt and leaves on the ground. The patient is somewhat confused and having 
auditory hallucinations. The patient has a history of schizophrenia, homelessness, and was 
weak and exhausted as reported by the ER nursing staff. Patient was then admitted to the 
Neuro floor where she was treated for heat exhaustion, metabolic encephalopathy, 
hypokalemia, and head lice. A 5150 was placed for GD. On admission, patient is delusional, 
she explains to this writer that she has been excluded from the "[facility]." Patient 
states she hears voices but will not elaborate on what they are saying. 



Assessment:

What happened this shift:

Patient isolates in her room this shift. Patient naps often but awakens easily to voice. 
Patients thought process looks somewhat slower than on the previous night shift. Patient 
denies any hallucinations when asked, she is slow to deny. This patient ate a full dinner. 
She is cooperative and kind. Patient denies S/I or H/I. Patient is medication compliant. 
Patient does not elaborate on her thoughts, she answers questions in short sentences only.

 

S/I, H/I: Denies.

A/VH: Patient denies.

Sleep: Napping frequently, will tally in am. 

ADL's: Requires direction and encouragement.

Group attendance: None on night shift. 

Were meds taken: Yes, medication compliant. 

Any med S/E: None noted. 





Mental Status Exam



Appearance: Somewhat disheveled, however appropriately dressed.

Eye contact: Good.

Behavior: Cooperative, anxious, guarded, isolative, and paranoid.

Speech: Quiet speech, regular rhythm.  

Mood: Some paranoia, quiet, sleepy.

Affect: Flat.

Thought process: Poverty of thought with possible thought blocking.

Thought Content:Unable to assess.

Cognition: Patient oriented to person, place, and time. Not to whole situation. 

Insight: Poor.

Judgment: Poor.



Interventions



PRN's used: none.



Therapeutic interventions: Maintained a safe and therapeutic environment, provided clear and 
simple instructions, attempted to orient to reality, encouraged ADLs and provided positive 
encouragement, provided medication education, monitored/encouraged meal/fluid intake, 
encouraged eating in the Group Room and ambulation on the unit, and maintained Q 15min 
safety checks.

Restraints/seclusion/emergency medication: N/A

## 2020-09-06 VITALS — DIASTOLIC BLOOD PRESSURE: 57 MMHG | SYSTOLIC BLOOD PRESSURE: 98 MMHG

## 2020-09-06 VITALS — SYSTOLIC BLOOD PRESSURE: 102 MMHG | DIASTOLIC BLOOD PRESSURE: 66 MMHG

## 2020-09-06 RX ADMIN — Medication SCH CAN: at 18:04

## 2020-09-06 RX ADMIN — PALIPERIDONE SCH MG: 3 TABLET, EXTENDED RELEASE ORAL at 21:15

## 2020-09-06 RX ADMIN — DOCUSATE SODIUM SCH MG: 100 CAPSULE, LIQUID FILLED ORAL at 21:15

## 2020-09-06 RX ADMIN — Medication SCH CAN: at 13:58

## 2020-09-06 RX ADMIN — VENLAFAXINE HYDROCHLORIDE SCH MG: 75 CAPSULE, EXTENDED RELEASE ORAL at 08:19

## 2020-09-06 RX ADMIN — PALIPERIDONE SCH MG: 3 TABLET, EXTENDED RELEASE ORAL at 08:19

## 2020-09-06 RX ADMIN — Medication SCH CAN: at 08:00

## 2020-09-06 RX ADMIN — POLYETHYLENE GLYCOL 3350 SCH GM: 17 POWDER, FOR SOLUTION ORAL at 08:18

## 2020-09-06 RX ADMIN — DOCUSATE SODIUM SCH MG: 100 CAPSULE, LIQUID FILLED ORAL at 08:20

## 2020-09-06 NOTE — NUR
Nursing Progress Note:



Legal hold: 5270



Client on involuntary status for GD



Report received from YAMILKA Fang with use of SBAR



Why are they here:

 This is a 65-year-old female who was brought to the Emergency Room via EMS. She was found 
in a pile of dirt and leaves on the ground. The patient is somewhat confused and having 
auditory hallucinations. The patient has a history of schizophrenia, homelessness, and was 
weak and exhausted as reported by the ER nursing staff. Patient was then admitted to the 
Neuro floor where she was treated for heat exhaustion, metabolic encephalopathy, 
hypokalemia, and head lice. A 5150 was placed for GD. On admission, patient is delusional, 
she explains to this writer that she has been excluded from the "[facility]." Patient 
states she hears voices but will not elaborate on what they are saying. 



Assessment:

What happened this shift: Pt is isolative to room. Patient did not want to come to dinning 
room for breakfast and declinded her Ensure shake.  She will respond to questions with 
minimal answers but pleasantly. Patient is seen ambulating in the becerra. Patient sat and 
talked with roommate. Patient had lunch in the group room. Patient back in bed. Encouraging 
patient to spend a little more time out of her room.



S/I, H/I: denies at this time

A/VH: denies at this time

Sleep: 8.5 per sleep assessment

ADL's: Encouragment needed

Group attendance: N/A

Were meds taken: Yes. 

Any med S/E: None noted.





Mental Status Exam



Appearance: Disheveled 

Eye contact: Direct

Behavior: Guarded, kind, isolative, cooperative. 

Speech: Soft, minimal.  

Mood: Guarded

Affect: Flat.

Thought process: Unable to assess.

Thought Content: Unable to assess.

Cognition: Alert

Insight: Poor.

Judgment: Poor.



Interventions



PRN's used: none.



Therapeutic interventions: Maintained a safe and therapeutic environment, provided clear and 
simple instructions, attempted to orient to reality, encouraged ADLs and provided positive 
encouragement, provided medication education, monitored/encouraged meal/fluid intake, 
encouraged eating in the Group Room and ambulation on the unit, and maintained Q 15min 
safety checks.

Restraints/seclusion/emergency medication: N/A

## 2020-09-06 NOTE — NUR
Nursing Progress Note: 



Legal hold: 5250



Client on involuntary status for GD



Report received from YAMILKA Nevarez with use of SBAR



Why are they here:

 This is a 65-year-old female who was brought to the Emergency Room via EMS. She was found 
in a pile of dirt and leaves on the ground. The patient is somewhat confused and having 
auditory hallucinations. The patient has a history of schizophrenia, homelessness, and was 
weak and exhausted as reported by the ER nursing staff. Patient was then admitted to the 
Neuro floor where she was treated for heat exhaustion, metabolic encephalopathy, 
hypokalemia, and head lice. A 5150 was placed for GD. On admission, patient is delusional, 
she explains to this writer that she has been excluded from the "[facility] Patient 
states she hears voices but will not elaborate on what they are saying. 



Assessment:

What happened this shift: 



Pt ambulating in room at start of shift.  She said that someone told her she needs to get 
out of bed more.  She said "I am taking baby steps walking in my room"  Pt is isolative to 
room.  She declined to ambulate in becerra or come to group room.  "I go to group room for 
meals."  



Pt was pleasant and cooperative with all care.  She was concerned about and very kind to new 
roommate. 



S/I, H/I: Pt Denies.

A/VH: Pt denies.

Sleep: Asleep at this time

ADL's: Independent.

Group attendance: No. 

Were meds taken: Yes. 

Any med S/E: None noted. 





Mental Status Exam



Appearance: Disheveled 

Eye contact: Direct

Behavior: Guarded, kind, isolative, cooperative. 

Speech: Soft, minimal.  

Mood: Guarded

Affect: Flat.

Thought process: Unable to assess.

Thought Content: Unable to assess.

Cognition: Alert

Insight: Poor.

Judgment: Poor.



Interventions



PRN's used: none.



Therapeutic interventions: Maintained a safe and therapeutic environment, provided clear and 
simple instructions, attempted to orient to reality, encouraged ADLs and provided positive 
encouragement, provided medication education, monitored/encouraged meal/fluid intake, 
encouraged eating in the Group Room and ambulation on the unit, and maintained Q 15min 
safety checks.

Restraints/seclusion/emergency medication: N/A

## 2020-09-07 VITALS — DIASTOLIC BLOOD PRESSURE: 81 MMHG | SYSTOLIC BLOOD PRESSURE: 127 MMHG

## 2020-09-07 VITALS — DIASTOLIC BLOOD PRESSURE: 63 MMHG | SYSTOLIC BLOOD PRESSURE: 93 MMHG

## 2020-09-07 RX ADMIN — DOCUSATE SODIUM SCH MG: 100 CAPSULE, LIQUID FILLED ORAL at 08:06

## 2020-09-07 RX ADMIN — POLYETHYLENE GLYCOL 3350 SCH GM: 17 POWDER, FOR SOLUTION ORAL at 08:06

## 2020-09-07 RX ADMIN — VENLAFAXINE HYDROCHLORIDE SCH MG: 75 CAPSULE, EXTENDED RELEASE ORAL at 08:06

## 2020-09-07 RX ADMIN — Medication SCH CAN: at 17:22

## 2020-09-07 RX ADMIN — PALIPERIDONE SCH MG: 3 TABLET, EXTENDED RELEASE ORAL at 20:11

## 2020-09-07 RX ADMIN — PALIPERIDONE SCH MG: 3 TABLET, EXTENDED RELEASE ORAL at 08:06

## 2020-09-07 RX ADMIN — Medication SCH CAN: at 13:28

## 2020-09-07 RX ADMIN — Medication SCH CAN: at 08:06

## 2020-09-07 NOTE — NUR
CM



Presenting Issues: 

Pt's on 5270, has been unable to engage w/SS due to significant sxs & functional impairments 
associated with her mental illness. 



Interventions: 

SS met w/pt this afternoon in an attempt to engage her in discussions re her dcp.  Pt 
reports that she has no ID & does not know where her bank card is.  Pt does not know if she 
wants to rt to [facility] or stay here in [facility].  Pt requested assistance to 
contact [employer] to get some info re her SSI benefits. 



Today pt's TP appears clearest since her admission. 



Plan: 

SS will meet w/pt tomorrow to assist her in contacting [facility]. 

Katalina Zacarias LCSW

-------------------------------------------------------------------------------

Addendum: 09/08/20 at 1422 by Katalina Zacarias 

-------------------------------------------------------------------------------

Amended: Links added.

## 2020-09-07 NOTE — NUR
Nursing Progress Note: Maria M



Legal hold: 5270 Expires 10/3.



Client on involuntary status for GD



Report received from nurse with use of SBAR: YAMILKA Fang



Why are they here:     This is a 65-year-old female who was brought to the Emergency Room 
via EMS. She was found in a pile of dirt and leaves on the ground. The patient is somewhat 
confused and having auditory hallucinations. The patient has a history of schizophrenia, 
homelessness, and was weak and exhausted as reported by the ER nursing staff. Patient was 
then admitted to the Neuro floor where she was treated for heat exhaustion, metabolic 
encephalopathy, hypokalemia, and head lice. A 5150 was placed for GD. On admission, patient 
is delusional, she explains to this writer that she has been excluded from the "[place] 
[place]." Patient states she hears voices but will not elaborate on what they are saying. 



Assessment:



What happened this shift: Received patient sleeping in bed at shift change. Pt. Is sleeping 
with covers pulled up to her neck and not her head completely covered. Pt is pleasant on 
greeting and tells this writer hello. Pt ate breakfast in her room, but then ate lunch in 
group room and will have her eat dinner there. Pt requested to change her linens today and 
completed without assistance. Pt denies all symptoms and in regards to AH, just thoughts in 
my head. Pt is talking about discharge and seems okay with it. When told [facility] is a good 
place oh good. Pt is becoming more visible on unit, but for the most part isolates to her 
room. Pt ate 100% of breakfast and lunch and 100% of both Ensures. Pt was encouraged to 
join group, but declined both AM/PM sessions. 





 

S/I, H/I: Denies both. 

A/VH: Denies both they are just thoughts in my head. 

Sleep: 10 hrs per Sleep Assessment. Intermittent naps throughout shift. Pt is not pulling 
blankets completely over her head- face is exposed. 

ADL's: Improving - Independent. Changed lines on her own. 

Group attendance: No

Were meds taken: Yes, without incident. 

Any med S/E: None observed or reported. 





Mental Status Exam



Appearance: Clean, short, buzzed hair, wearing green unit scrubs.

Eye contact: Fair. Getting better. 

Behavior: Cooperative, a little anxious, isolative. Increasing conversation. Up sitting in 
chair. 

Speech: Soft, clear, conversing more.  

Mood: Improving. Denies depression. 

Affect: Blunted to bright.  

Thought process: Poverty of thought, but improving. 

Thought Content: Discharge. Pt knows she will be discharged soon.  

Cognition: A&O X3 (reason here). 

Insight: Poor

Judgment: Poor



Interventions



PRN's used: None



Therapeutic interventions: Maintained a safe and therapeutic environment, provided clear and 
simple instructions, encouraged ADLs and provided positive encouragement, provided 
medication education, monitored/encouraged meal/fluid intake, encouraged eating in the Group 
Room and ambulation on the unit, and maintained Q 15 min safety checks.



Restraints/seclusion/emergency medication: N/A



Justification of Continued Inpatient Treatment: Patients psychosis is improving and a safe 
discharge plan is being developed. Patient will continue with therapeutic milieu.

## 2020-09-07 NOTE — NUR
Nursing Progress Note:



Legal hold: 5270 Expires 10/3.



Client on involuntary status for GD



Report received from nurse with use of SBAR: YAMILKA Saha



Why are they here:     This is a 65-year-old female who was brought to the Emergency Room 
via EMS. She was found in a pile of dirt and leaves on the ground. The patient is somewhat 
confused and having auditory hallucinations. The patient has a history of schizophrenia, 
homelessness, and was weak and exhausted as reported by the ER nursing staff. Patient was 
then admitted to the Neuro floor where she was treated for heat exhaustion, metabolic 
encephalopathy, hypokalemia, and head lice. A 5150 was placed for GD. On admission, patient 
is delusional, she explains to this writer that she has been excluded from the "[place] 
[place]." Patient states she hears voices but will not elaborate on what they are saying. 



Assessment:



What happened this shift: 



Patient awake in bed at shift change. Pt is pleasant and cooperative.  She said she has been 
ambulating in halls more.  Encouraged to continue.  Pt said she looked into group but did 
not stay.  Pt encouraged to have attending groups as a goal.  She is open to the idea. 



Pt reported loose stools.  Colace and Miralax have been changed to PRN.  Pt educated on what 
this means she verbalized understanding.  



She declined to come to group room for Snack.   



S/I, H/I: Denies both. 

A/VH: Denies both they are just thoughts in my head. 

Sleep: Asleeep at this time

ADL's: Improving - Independent. Changed lines on her own. 

Group attendance: No

Were meds taken: Yes, without incident. 

Any med S/E: None observed or reported. 





Mental Status Exam



Appearance: Clean, short, buzzed hair, wearing green unit scrubs.

Eye contact: Fair. Getting better. 

Behavior: Cooperative, a little anxious, less isolative. Increasing conversation. Up sitting 
in chair. 

Speech: Soft, clear, conversing more.  

Mood: Improving. Denies depression. 

Affect: Blunted to bright.  

Thought process: Poverty of thought, but improving. 

Thought Content: Discharge. Pt knows she will be discharged soon.  

Cognition: A&O X3 (reason here). 

Insight: Poor

Judgment: Poor



Interventions



PRN's used: None



Therapeutic interventions: Maintained a safe and therapeutic environment, provided clear and 
simple instructions, encouraged ADLs and provided positive encouragement, provided 
medication education, monitored/encouraged meal/fluid intake, encouraged eating in the Group 
Room and ambulation on the unit, and maintained Q 15 min safety checks.



Restraints/seclusion/emergency medication: N/A



Justification of Continued Inpatient Treatment: Patients psychosis is improving and a safe 
discharge plan is being developed. Patient will continue with therapeutic milieu.

## 2020-09-07 NOTE — NUR
Nursing Progress Note:



Legal hold: 5270



Client on involuntary status for GD



Report received from YAMILKA Skelton with use of SBAR



Why are they here:

 This is a 65-year-old female who was brought to the Emergency Room via EMS. She was found 
in a pile of dirt and leaves on the ground. The patient is somewhat confused and having 
auditory hallucinations. The patient has a history of schizophrenia, homelessness, and was 
weak and exhausted as reported by the ER nursing staff. Patient was then admitted to the 
Neuro floor where she was treated for heat exhaustion, metabolic encephalopathy, 
hypokalemia, and head lice. A 5150 was placed for GD. On admission, patient is delusional, 
she explains to this writer that she has been excluded from the "[place]." Patient 
states she hears voices but will not elaborate on what they are saying. 



Assessment:

What happened this shift: Pt in room at start of shift.  She is pleasant and cooperative 
answers questions rarely initiates conversation.  Encouraged to ambulate on the unit.  
Declined to come to group room for snack but did ambulate to nurses station to thank staff 
for an article of clothing given to her.  



S/I, H/I: denies 

A/VH: denies 

Sleep: sleeping at this time

ADL's: Encouragement needed

Group attendance: N/A

Were meds taken: Yes. 

Any med S/E: None noted.





Mental Status Exam



Appearance: Disheveled 

Eye contact: Direct

Behavior: Guarded, kind, isolative, cooperative. 

Speech: Soft, minimal.  

Mood: Guarded

Affect: Flat.

Thought process: Unable to assess.

Thought Content: Unable to assess.

Cognition: Alert

Insight: Poor.

Judgment: Poor.



Interventions



PRN's used: none.



Therapeutic interventions: Maintained a safe and therapeutic environment, provided clear and 
simple instructions, attempted to orient to reality, encouraged ADLs and provided positive 
encouragement, provided medication education, monitored/encouraged meal/fluid intake, 
encouraged eating in the Group Room and ambulation on the unit, and maintained Q 15min 
safety checks.

Restraints/seclusion/emergency medication: N/A

## 2020-09-08 VITALS — DIASTOLIC BLOOD PRESSURE: 74 MMHG | SYSTOLIC BLOOD PRESSURE: 134 MMHG

## 2020-09-08 VITALS — SYSTOLIC BLOOD PRESSURE: 87 MMHG | DIASTOLIC BLOOD PRESSURE: 55 MMHG

## 2020-09-08 RX ADMIN — PALIPERIDONE SCH MG: 3 TABLET, EXTENDED RELEASE ORAL at 20:12

## 2020-09-08 RX ADMIN — Medication SCH CAN: at 08:21

## 2020-09-08 RX ADMIN — VENLAFAXINE HYDROCHLORIDE SCH MG: 75 CAPSULE, EXTENDED RELEASE ORAL at 08:21

## 2020-09-08 RX ADMIN — Medication SCH CAN: at 18:22

## 2020-09-08 RX ADMIN — Medication SCH CAN: at 09:21

## 2020-09-08 RX ADMIN — PALIPERIDONE SCH MG: 3 TABLET, EXTENDED RELEASE ORAL at 08:21

## 2020-09-08 NOTE — NUR
CM



Presenting Issues: 

Pt requesting SS support to contact [facility] to check on her benefits, [employer] to transfer her 
benefits from [facility] to [facility] so she can access services following d/c from [facility]



Interventions: 

SS met w/pt and provided support for her to contact [facility], pt was able to engage in 
conversation w/[employer] and understood what she needs to do to access her funds following d/c. 



Pt contacted [employer] and will request for her [employer] benefits to be transfer from [facility] 
to [facility]. 



Plan: 

SS will check in w/pt tomorrow re her contact w/[employer]. 

Katalina Zacarias LCSW

-------------------------------------------------------------------------------

Addendum: 09/08/20 at 1520 by Katalina Zacarias 

-------------------------------------------------------------------------------

Amended: Links added.

## 2020-09-08 NOTE — NUR
Nursing Progress Note: Maria M

Legal hold: 5270 Expires 10/3.

Client on involuntary status for GD

Report received from nurse with use of SBAR:  YAMILKA Colón

Why are they here:     This is a 65-year-old female who was brought to the Emergency Room 
via EMS. She was found in a pile of dirt and leaves on the ground. The patient is somewhat 
confused and having auditory hallucinations. The patient has a history of schizophrenia, 
homelessness, and was weak and exhausted as reported by the ER nursing staff. Patient was 
then admitted to the Neuro floor where she was treated for heat exhaustion, metabolic 
encephalopathy, hypokalemia, and head lice. A 5150 was placed for GD. On admission, patient 
is delusional, she explains to this writer that she has been excluded from the "[place] 
[place]." Patient states she hears voices but will not elaborate on what they are saying. 

Assessment:

What happened this shift: Patient lying in bed with covers pulled over her head seemingly 
trying to drown out the noise from roommate.  Patient reports that she does not hear 
auditory hallucinations, that she has a lot of thoughts in her head.  Patient questioned NOC 
medication and assured her she only gets one medication at NOC time.

S/I, H/I: Denies both. 

A/VH: Denies both they are just thoughts in my head. 

Sleep: 9.5 hrs NOC shift, napped throughout day.

ADL's: Independent.

Group attendance: No

Were meds taken: Yes, without incident. 

Any med S/E: None observed or reported. 

Mental Status Exam

Appearance:  Clean and neat in unit attire.

Eye contact: Fair. 

Behavior: Anxious, cooperative.

Speech: Soft, clear.

Mood: "I'm okay".

Affect: Blunted.

Thought process: Poverty of thought, circumstantial.

Thought Content: Court proceedings for 5270.

Cognition: A&O X3. 

Insight: Poor

Judgment: Poor

Interventions

PRN's used: None

Therapeutic interventions: Maintained a safe and therapeutic environment, provided clear and 
simple instructions, encouraged ADLs and provided positive encouragement, provided 
medication education, monitored/encouraged meal/fluid intake, encouraged eating in the Group 
Room and ambulation on the unit, and maintained Q 15 min safety checks.

Restraints/seclusion/emergency medication: N/A

Justification of Continued Inpatient Treatment: Patients psychosis is improving and a safe 
discharge plan is being developed. Patient will continue with therapeutic milieu.

## 2020-09-08 NOTE — NUR
Nursing Progress Note: Maria M



Legal hold: 5270 Expires 10/3.



Client on involuntary status for GD



Report received from nurse with use of SBAR:  YAMILKA Colón



Why are they here:     This is a 65-year-old female who was brought to the Emergency Room 
via EMS. She was found in a pile of dirt and leaves on the ground. The patient is somewhat 
confused and having auditory hallucinations. The patient has a history of schizophrenia, 
homelessness, and was weak and exhausted as reported by the ER nursing staff. Patient was 
then admitted to the Neuro floor where she was treated for heat exhaustion, metabolic 
encephalopathy, hypokalemia, and head lice. A 5150 was placed for GD. On admission, patient 
is delusional, she explains to this writer that she has been excluded from the "King[place] 
[place]." Patient states she hears voices but will not elaborate on what they are saying. 



Assessment:



What happened this shift: Patient lying in bed with covers pulled over her head seemingly 
trying to drown out the noise from roommate.  Patient reports that she does not hear 
auditory hallucinations, that she has a lot of thoughts in her head.  Patient requests 
information about 5270 hearing today, notified her of what will happen and times.  Patient 
states that she is nervous about court and just in general.  Patient reports diarrhea 
yesterday and does not want any Murilax or Colace.  Patient attends meals in group room.

 

S/I, H/I: Denies both. 

A/VH: Denies both they are just thoughts in my head. 

Sleep: 9.5 hrs NOC shift, napped throughout day.

ADL's: Independent.

Group attendance: No

Were meds taken: Yes, without incident. 

Any med S/E: None observed or reported. 





Mental Status Exam



Appearance:  Clean and neat in unit attire.

Eye contact: Fair. 

Behavior: Anxious, cooperative.

Speech: Soft, clear.

Mood: "I'm okay".

Affect: Blunted.

Thought process: Poverty of thought, circumstantial.

Thought Content: Court proceedings for 5270.

Cognition: A&O X3. 

Insight: Poor

Judgment: Poor



Interventions



PRN's used: None



Therapeutic interventions: Maintained a safe and therapeutic environment, provided clear and 
simple instructions, encouraged ADLs and provided positive encouragement, provided 
medication education, monitored/encouraged meal/fluid intake, encouraged eating in the Group 
Room and ambulation on the unit, and maintained Q 15 min safety checks.



Restraints/seclusion/emergency medication: N/A



Justification of Continued Inpatient Treatment: Patients psychosis is improving and a safe 
discharge plan is being developed. Patient will continue with therapeutic milieu.

## 2020-09-09 VITALS — SYSTOLIC BLOOD PRESSURE: 113 MMHG | DIASTOLIC BLOOD PRESSURE: 78 MMHG

## 2020-09-09 VITALS — DIASTOLIC BLOOD PRESSURE: 79 MMHG | SYSTOLIC BLOOD PRESSURE: 128 MMHG

## 2020-09-09 RX ADMIN — Medication SCH CAN: at 13:18

## 2020-09-09 RX ADMIN — PALIPERIDONE SCH MG: 3 TABLET, EXTENDED RELEASE ORAL at 20:36

## 2020-09-09 RX ADMIN — Medication SCH CAN: at 18:12

## 2020-09-09 RX ADMIN — PALIPERIDONE SCH MG: 3 TABLET, EXTENDED RELEASE ORAL at 08:18

## 2020-09-09 RX ADMIN — VENLAFAXINE HYDROCHLORIDE SCH MG: 75 CAPSULE, EXTENDED RELEASE ORAL at 08:18

## 2020-09-09 RX ADMIN — Medication SCH CAN: at 08:00

## 2020-09-09 NOTE — NUR
Nursing Progress Note: Maria M



Legal hold: 5270 Expires 10/3.



Client on involuntary status for GD



Report received from nurse with use of SBAR:  YAMILKA Colón



Why are they here:     This is a 65-year-old female who was brought to the Emergency Room 
via EMS. She was found in a pile of dirt and leaves on the ground. The patient is somewhat 
confused and having auditory hallucinations. The patient has a history of schizophrenia, 
homelessness, and was weak and exhausted as reported by the ER nursing staff. Patient was 
then admitted to the Neuro floor where she was treated for heat exhaustion, metabolic 
encephalopathy, hypokalemia, and head lice. A 5150 was placed for GD. On admission, patient 
is delusional, she explains to this writer that she has been excluded from the "King[place] of 
[place]." Patient states she hears voices but will not elaborate on what they are saying. 



Assessment:



What happened this shift: Patient was asleep at change of shift and awakened by RN to give 
medications. Patient then went to breakfast.  Patient is smiley and pleasant.  Patient 
sleeps a lot during the day but also will watch T.V. in Community or the Rec Room.  Patient 
denies suicidal/homicidal ideation.  Patient denies hearing voices.  Patient did not go to 
group today.

 

S/I, H/I: Denies 

A/VH: Denies 

Sleep: Several naps today

ADL's: Independent.

Group attendance: No

Were meds taken: Yes 

Any med S/E: None observed or reported. 





Mental Status Exam



Appearance:  Clean and neat in unit attire. Patient took a shower today.

Eye contact: Good

Behavior:  cooperative.

Speech: Soft, clear.

Mood: Depressed

Affect: Blunted.

Thought process: Poverty of thought, circumstantial.

Thought Content: Changing counties

Cognition: A&O X3. 

Insight: Poor

Judgment: Poor



Interventions



PRN's used: None



Therapeutic interventions: Maintained a safe and therapeutic environment, provided clear and 
simple instructions, encouraged ADLs and provided positive encouragement, provided 
medication education, monitored/encouraged meal/fluid intake, encouraged eating in the Group 
Room and ambulation on the unit, and maintained Q 15 min safety checks.



Restraints/seclusion/emergency medication: N/A



Justification of Continued Inpatient Treatment: Patients psychosis is improving and a safe 
discharge plan is being developed. Patient will continue with therapeutic milieu.

## 2020-09-10 VITALS — SYSTOLIC BLOOD PRESSURE: 145 MMHG | DIASTOLIC BLOOD PRESSURE: 85 MMHG

## 2020-09-10 RX ADMIN — VENLAFAXINE HYDROCHLORIDE SCH MG: 75 CAPSULE, EXTENDED RELEASE ORAL at 08:25

## 2020-09-10 RX ADMIN — Medication SCH CAN: at 18:00

## 2020-09-10 RX ADMIN — Medication SCH CAN: at 08:26

## 2020-09-10 RX ADMIN — PALIPERIDONE SCH MG: 3 TABLET, EXTENDED RELEASE ORAL at 08:26

## 2020-09-10 RX ADMIN — Medication SCH CAN: at 13:21

## 2020-09-10 RX ADMIN — PALIPERIDONE SCH MG: 3 TABLET, EXTENDED RELEASE ORAL at 21:16

## 2020-09-10 NOTE — NUR
F/u (9/10): Pt % avg meals and ensure enlive TID meeting needs. CHASE 

d/w RN regarding recommend ensure high protein TIDWM instead of enlive 

given improved PO. LBM 9/10 per EMR. No nutrition concerns at this time. 

Will continue to monitor.

Recommendations:

1) Continue regular diet

2) Encourage PO intake

3) Ensure Enlive TID; consider change to ensure high protein TIDWM if MD 

agreeable given improved PO

4) Routine bowel care

5) Scaled weights per rx

-------------------------------------------------------------------------------

Addendum: 09/10/20 at 1523 by Tu Nash RD

-------------------------------------------------------------------------------

Amended: Links added.

## 2020-09-10 NOTE — NUR
Nursing Progress Note: Maria M



Legal hold: 5270 Expires 10/3.



Client on involuntary status for GD



Report received from nurse with use of SBAR:  YAMILKA Colón



Why are they here:     This is a 65-year-old female who was brought to the Emergency Room 
via EMS. She was found in a pile of dirt and leaves on the ground. The patient is somewhat 
confused and having auditory hallucinations. The patient has a history of schizophrenia, 
homelessness, and was weak and exhausted as reported by the ER nursing staff. Patient was 
then admitted to the Neuro floor where she was treated for heat exhaustion, metabolic 
encephalopathy, hypokalemia, and head lice. A 5150 was placed for GD. On admission, patient 
is delusional, she explains to this writer that she has been excluded from the "[place] 
[place]." Patient states she hears voices but will not elaborate on what they are saying. 



Assessment:



What happened this shift: Patient was asleep at change of shift and RN awoke patient about 
0820 to give her her medication.  Patient is always polite and kind.  Patient got up and 
went to breakfast although she asked if she could eat in her room and RN said no.  Patient 
denies suicidal/homicidal ideation.  Patient denies audio/visual hallucinations.  Patient 
has not made any delusional statements.  Patient is in the process of having her insurance 
changed to [facility].  Patient wants to be able to go to the [facility]. Patient appears to 
have a good relationship with her roommate and assists her as her roommate in handicapped. 

 

S/I, H/I: Denies 

A/VH: Denies 

Sleep: Several naps today

ADL's: Independent.

Group attendance: No

Were meds taken: Yes 

Any med S/E: None observed or reported. 





Mental Status Exam



Appearance:  Clean and neat in green scrubs

Eye contact: Good

Behavior:  cooperative.

Speech: Soft, clear.

Mood: Depressed

Affect: Blunted.

Thought process: Linear

Thought Content: [place]

Cognition: A&O X3. 

Insight: Poor

Judgment: Poor



Interventions



PRN's used: None



Therapeutic interventions: Maintained a safe and therapeutic environment, provided clear and 
simple instructions, encouraged ADLs and provided positive encouragement, provided 
medication education, monitored/encouraged meal/fluid intake, encouraged eating in the Group 
Room and ambulation on the unit, and maintained Q 15 min safety checks.



Restraints/seclusion/emergency medication: N/A



Justification of Continued Inpatient Treatment: Patients psychosis is improving and a safe 
discharge plan is being developed. Patient will continue with therapeutic milieu.

## 2020-09-10 NOTE — NUR
Nursing Progress Note:



Legal hold: 5270 Expires 10/3.



Client on involuntary status for GD



Report received from nurse with use of SBAR: YAMILKA Saha



Why are they here:     This is a 65-year-old female who was brought to the Emergency Room 
via EMS. She was found in a pile of dirt and leaves on the ground. The patient is somewhat 
confused and having auditory hallucinations. The patient has a history of schizophrenia, 
homelessness, and was weak and exhausted as reported by the ER nursing staff. Patient was 
then admitted to the Neuro floor where she was treated for heat exhaustion, metabolic 
encephalopathy, hypokalemia, and head lice. A 5150 was placed for GD. On admission, patient 
is delusional, she explains to this writer that she has been excluded from the "[facility] 
[place]." Patient states she hears voices but will not elaborate on what they are saying. 



Assessment:



What happened this shift: 

Pt was in her room during shift change.  Appeared to be sleeping but was able to greet this 
RN.  She is pleasant and cooperative during 1:1 physical assessment and took her HS 
medication.  Denies any S/I, H/I, A/VH, and states that she is just having a hard time 
controlling her own thoughts.  She states that when her minds starts to wonder off she needs 
a little push to try to control her emotions.  "It's not voices that I hear, its just lots 
of stuff going through my head."  Pt did not elaborate on this thoughts but she denies 
feeling anxious.  States that overall she is feeling a lot better and more motivated to get 
better.  She was observed out of her room asking for snack but for the most part remained in 
her room sleeping.  

  

S/I, H/I: Denies both. 

A/VH: Denies 

Sleep: Currently sleeping, see sleep assessment for total hours 

ADL's: Independent 

Group attendance: None during night shift 

Were meds taken: Yes

Any med S/E: None observed or reported. 





Mental Status Exam



Appearance: Clean, short, buzzed hair, wearing green unit scrubs, brown coat over 

Eye contact: Direct, good 

Behavior: Cooperative, pleasant, smiling and laughing, appreciative  

Speech: Soft, clear, normal rate and rhythm 

Mood: Hopeful, less depressed, somewhat worried 

Affect: Blunted with intermittent brightening 

Thought process: Circumstantial 

Thought Content:  Not being able to control her thoughts, discharge, meds, snacks 

Cognition: A&O X3 (reason here). 

Insight: Fair 

Judgment: Poor



Interventions



PRN's used: None



Therapeutic interventions: Maintained a safe and therapeutic environment, provided clear and 
simple instructions, encouraged ADLs and provided positive encouragement, provided 
medication education, monitored/encouraged meal/fluid intake, encouraged eating in the Group 
Room and ambulation on the unit, and maintained Q 15 min safety checks.



Restraints/seclusion/emergency medication: N/A



Justification of Continued Inpatient Treatment: Patients psychosis is improving and a safe 
discharge plan is being developed. Patient will continue with therapeutic milieu.

## 2020-09-11 VITALS — DIASTOLIC BLOOD PRESSURE: 73 MMHG | SYSTOLIC BLOOD PRESSURE: 123 MMHG

## 2020-09-11 VITALS — DIASTOLIC BLOOD PRESSURE: 71 MMHG | SYSTOLIC BLOOD PRESSURE: 103 MMHG

## 2020-09-11 RX ADMIN — PALIPERIDONE SCH MG: 3 TABLET, EXTENDED RELEASE ORAL at 20:52

## 2020-09-11 RX ADMIN — Medication SCH CAN: at 17:43

## 2020-09-11 RX ADMIN — VENLAFAXINE HYDROCHLORIDE SCH MG: 75 CAPSULE, EXTENDED RELEASE ORAL at 08:16

## 2020-09-11 RX ADMIN — Medication SCH CAN: at 13:03

## 2020-09-11 RX ADMIN — PALIPERIDONE SCH MG: 3 TABLET, EXTENDED RELEASE ORAL at 08:16

## 2020-09-11 RX ADMIN — Medication SCH CAN: at 08:00

## 2020-09-11 NOTE — NUR
Nursing Progress Note: Maria M



Legal hold: 5270 Expires 10/3.



Client on involuntary status for GD



Report received from nurse with use of SBAR:  Annalisa PRATHER



Why are they here:     This is a 65-year-old female who was brought to the Emergency Room 
via EMS. She was found in a pile of dirt and leaves on the ground. The patient is somewhat 
confused and having auditory hallucinations. The patient has a history of schizophrenia, 
homelessness, and was weak and exhausted as reported by the ER nursing staff. Patient was 
then admitted to the Neuro floor where she was treated for heat exhaustion, metabolic 
encephalopathy, hypokalemia, and head lice. A 5150 was placed for GD. On admission, patient 
is delusional, she explains to this writer that she has been excluded from the "[facility] 
[facility]." Patient states she hears voices but will not elaborate on what they are saying. 



Assessment:



What happened this shift: Received patient sleeping in bed with the covers pulled up over 
her head. Pt is easily roused for AM care and medications. Pt was encouraged to come to the 
group for breakfast, but refused. I have eaten a lot the last couple of days, I am okay. 
Pt ate 100% of her lunch and was attended  AM/PM snacks.  Pt denies SI/HI, but is less 
hesitant to respond when asked about A/VH - pt denies both just passing thoughts. Pts 
affect is brighter and appears not to be paranoid. Pt stands in line for snacks without 
issue, but is hesitant about attending group. 

 

S/I, H/I: Denies both.

A/VH: Denies both

Sleep: 8 hrs per Sleep Assessment. Naps throughout day.

ADL's: Independent.

Group attendance: No

Were meds taken: Yes, without hesitation.

Any med S/E: None observed or reported. 





Mental Status Exam



Appearance: Slightly disheveled, wearing green unit scrubs and a big brown jacket. 

Eye contact: Good

Behavior:  Continues to isolate to her room, but is becoming more visible on unit. 
Cooperative, pleasant.

Speech: Soft, clear, minimal

Mood: Euthymic

Affect: Blunted.

Thought process: Linear

Thought Content: Situational

Cognition: A&O X3. 

Insight: Poor

Judgment: Poor



Interventions



PRN's used: None



Therapeutic interventions: Maintained a safe and therapeutic environment, provided clear and 
simple instructions, encouraged ADLs and provided positive encouragement, provided 
medication education, monitored/encouraged meal/fluid intake, encouraged eating in the Group 
Room and ambulation on the unit, and maintained Q 15 min safety checks.



Restraints/seclusion/emergency medication: N/A



Justification of Continued Inpatient Treatment: Patients psychosis is improving and a safe 
discharge plan is being developed. Patient will continue with therapeutic milieu.

## 2020-09-11 NOTE — NUR
Nursing Progress Note:



Legal hold: 5270 Expires 10/3.



Client on involuntary status for GD



Report received from nurse with use of SBAR: YAMILKA Saha



Why are they here:  This is a 65-year-old female who was brought to the Emergency Room via 
EMS. She was found in a pile of dirt and leaves on the ground. The patient is somewhat 
confused and having auditory hallucinations. The patient has a history of schizophrenia, 
homelessness, and was weak and exhausted as reported by the ER nursing staff. Patient was 
then admitted to the Neuro floor where she was treated for heat exhaustion, metabolic 
encephalopathy, hypokalemia, and head lice. A 5150 was placed for GD. On admission, patient 
is delusional, she explains to this writer that she has been excluded from the "[facility] 
[facility]." Patient states she hears voices but will not elaborate on what they are saying. 



Assessment:



What happened this shift: 

Pt was visible in the unit during shift change.  States that she is doing well.  Pt was 
sleeping for most of the evening.  She was cooperative for 1:1 physical assessment and took 
her HS medications.  She denies any S/I, H/I, A/VH, depression or anxiety.  She appears to 
be in good spirits although states that she feels tired.  She retired to sleep after she 
received her medication.  Continues to improve overall.   

  

S/I, H/I: Denies both. 

A/VH: Denies 

Sleep: Currently sleeping, see sleep assessment for total hours 

ADL's: Independent 

Group attendance: None during night shift 

Were meds taken: Yes

Any med S/E: None observed or reported. 





Mental Status Exam



Appearance: Clean, short, buzzed hair, wearing green unit scrubs, brown coat over 

Eye contact: Direct, good 

Behavior: Cooperative, pleasant, isolative

Speech: Soft, clear, normal rate and rhythm 

Mood: Tired 

Affect: Blunted with intermittent brightening 

Thought process: Circumstantial 

Thought Content:  Sleeping most of the evening 

Cognition: A&O X3 (reason here). 

Insight: Fair 

Judgment: Poor



Interventions



PRN's used: None



Therapeutic interventions: Maintained a safe and therapeutic environment, provided clear and 
simple instructions, encouraged ADLs and provided positive encouragement, provided 
medication education, monitored/encouraged meal/fluid intake, encouraged eating in the Group 
Room and ambulation on the unit, and maintained Q 15 min safety checks.



Restraints/seclusion/emergency medication: N/A



Justification of Continued Inpatient Treatment: Patients psychosis is improving and a safe 
discharge plan is being developed. Patient will continue with therapeutic milieu.

## 2020-09-12 VITALS — SYSTOLIC BLOOD PRESSURE: 117 MMHG | DIASTOLIC BLOOD PRESSURE: 74 MMHG

## 2020-09-12 VITALS — SYSTOLIC BLOOD PRESSURE: 103 MMHG | DIASTOLIC BLOOD PRESSURE: 67 MMHG

## 2020-09-12 RX ADMIN — Medication SCH CAN: at 18:17

## 2020-09-12 RX ADMIN — Medication SCH CAN: at 08:17

## 2020-09-12 RX ADMIN — PALIPERIDONE SCH MG: 3 TABLET, EXTENDED RELEASE ORAL at 21:21

## 2020-09-12 RX ADMIN — Medication SCH CAN: at 13:00

## 2020-09-12 RX ADMIN — PALIPERIDONE SCH MG: 3 TABLET, EXTENDED RELEASE ORAL at 08:15

## 2020-09-12 RX ADMIN — VENLAFAXINE HYDROCHLORIDE SCH MG: 75 CAPSULE, EXTENDED RELEASE ORAL at 08:15

## 2020-09-12 NOTE — NUR
Nursing Progress Note: Maria M



Legal hold: 5270 Expires 10/3.



Client on involuntary status for GD



Report received from nurse with use of SBAR:  Annalisa PRATHER



Why are they here:     This is a 65-year-old female who was brought to the Emergency Room 
via EMS. She was found in a pile of dirt and leaves on the ground. The patient is somewhat 
confused and having auditory hallucinations. The patient has a history of schizophrenia, 
homelessness, and was weak and exhausted as reported by the ER nursing staff. Patient was 
then admitted to the Neuro floor where she was treated for heat exhaustion, metabolic 
encephalopathy, hypokalemia, and head lice. A 5150 was placed for GD. On admission, patient 
is delusional, she explains to this writer that she has been excluded from the "[facility] 
[facility]." Patient states she hears voices but will not elaborate on what they are saying. 



Assessment:



What happened this shift: Patient was asleep at change of shift and RN awoke patient for 
breakfast.  Patient smiles at RN and is pleasant and isolative.  Patient does not go to 
group but is social with her roommate.  Today patient was sitting in a chair looking out her 
window.  Patient denies suicidal/homicidal ideation.  Patient denies audio/visual 
hallucinations.  Patient is trying to get her disability moved to [facility].  Patient 
states her disability will be active in [facility] on 10/1/20.  Patient took several naps 
today.  

 

S/I, H/I: Denies both.

A/VH: Denies both

Sleep: Several naps during the day

ADL's: Independent.

Group attendance: No

Were meds taken: Yes

Any med S/E: None observed or reported. 





Mental Status Exam



Appearance: Slightly disheveled, wearing green unit scrubs and a big brown jacket. 

Eye contact: Good

Behavior:  Pleasant and cooperative. Continues to isolate to her room

Speech: Soft, clear, minimal

Mood: Euthymic

Affect: flat

Thought process: Linear

Thought Content: Situational

Cognition: A&O X3. 

Insight: Poor

Judgment: Poor



Interventions



PRN's used: None



Therapeutic interventions: Maintained a safe and therapeutic environment, provided clear and 
simple instructions, encouraged ADLs and provided positive encouragement, provided 
medication education, monitored/encouraged meal/fluid intake, encouraged eating in the Group 
Room and ambulation on the unit, and maintained Q 15 min safety checks.



Restraints/seclusion/emergency medication: N/A



Justification of Continued Inpatient Treatment: Patients psychosis is improving and a safe 
discharge plan is being developed. Patient will continue with therapeutic milieu.

## 2020-09-12 NOTE — NUR
Nursing Progress Note:



Legal hold: 5270 Expires 10/3.



Client on involuntary status for GD



Report received from nurse with use of SBAR: YAMILKA Nevarez



Why are they here:  This is a 65-year-old female who was brought to the Emergency Room via 
EMS. She was found in a pile of dirt and leaves on the ground. The patient is somewhat 
confused and having auditory hallucinations. The patient has a history of schizophrenia, 
homelessness, and was weak and exhausted as reported by the ER nursing staff. Patient was 
then admitted to the Neuro floor where she was treated for heat exhaustion, metabolic 
encephalopathy, hypokalemia, and head lice. A 5150 was placed for GD. On admission, patient 
is delusional, she explains to this writer that she has been excluded from the "[facility] 
[facility]." Patient states she hears voices but will not elaborate on what they are saying. 



Assessment:



What happened this shift: 

Pt was sleeping during shift change with her blanket covered from head to toe.  Appears to 
be in no distress.  Pt was isolative to her room today for the entire evening.  Took her HS 
medication and was cooperative for 1:1 physical assessment.  Denies any S/I,H/I, AV/H and 
did not appear to be responding to internal stimuli.  She states that she just feels very 
tired and just wants to go back to sleep.  Encouraged pt to come out and ambulate but 
refused.  She did not come out for snack and did not ask for one.  She appeared more 
withdrawn and guarded than previous days today.  She was not observed socializing or 
interacting with other peers or staff. 

  

S/I, H/I: Denies both. 

A/VH: Denies 

Sleep: Currently sleeping, see sleep assessment for total hours 

ADL's: Independent 

Group attendance: None during night shift 

Were meds taken: Yes

Any med S/E: None observed or reported. 





Mental Status Exam



Appearance: Clean, short, buzzed hair, wearing green unit scrubs, brown coat over 

Eye contact: Direct, good 

Behavior: Cooperative, isolative, guarded and withdrawn 

Speech: Soft, clear, minimal, normal rate and rhythm 

Mood: Fatigued, appears depressed 

Affect: Blunted

Thought process: Circumstantial 

Thought Content:  Sleeping most of the evening 

Cognition: A&O X3 (reason here). 

Insight: Fair 

Judgment: Poor



Interventions



PRN's used: None



Therapeutic interventions: Maintained a safe and therapeutic environment, provided clear and 
simple instructions, encouraged ADLs and provided positive encouragement, provided 
medication education, monitored/encouraged meal/fluid intake, encouraged eating in the Group 
Room and ambulation on the unit, and maintained Q 15 min safety checks.



Restraints/seclusion/emergency medication: N/A



Justification of Continued Inpatient Treatment: Patients psychosis is improving and a safe 
discharge plan is being developed. Patient will continue with therapeutic milieu.

## 2020-09-13 VITALS — DIASTOLIC BLOOD PRESSURE: 77 MMHG | SYSTOLIC BLOOD PRESSURE: 116 MMHG

## 2020-09-13 VITALS — SYSTOLIC BLOOD PRESSURE: 100 MMHG | DIASTOLIC BLOOD PRESSURE: 55 MMHG

## 2020-09-13 RX ADMIN — PALIPERIDONE SCH MG: 3 TABLET, EXTENDED RELEASE ORAL at 08:02

## 2020-09-13 RX ADMIN — Medication SCH CAN: at 13:02

## 2020-09-13 RX ADMIN — PALIPERIDONE SCH MG: 3 TABLET, EXTENDED RELEASE ORAL at 21:01

## 2020-09-13 RX ADMIN — VENLAFAXINE HYDROCHLORIDE SCH MG: 37.5 CAPSULE, EXTENDED RELEASE ORAL at 08:02

## 2020-09-13 RX ADMIN — Medication SCH CAN: at 08:04

## 2020-09-13 RX ADMIN — Medication SCH CAN: at 18:06

## 2020-09-13 NOTE — NUR
Nursing Progress Note:



Legal hold: 5270 Expires 10/3.



Client on involuntary status for GD



Report received from nurse with use of SBAR: YAMILKA Nevarez



Why are they here:  This is a 65-year-old female who was brought to the Emergency Room via 
EMS. She was found in a pile of dirt and leaves on the ground. The patient is somewhat 
confused and having auditory hallucinations. The patient has a history of schizophrenia, 
homelessness, and was weak and exhausted as reported by the ER nursing staff. Patient was 
then admitted to the Neuro floor where she was treated for heat exhaustion, metabolic 
encephalopathy, hypokalemia, and head lice. A 5150 was placed for GD. On admission, patient 
is delusional, she explains to this writer that she has been excluded from the "[facility] 
[facility]." Patient states she hears voices but will not elaborate on what they are saying. 



Assessment:



What happened this shift: 

Pt was in her room during shift change.  She states that she is doing well and greeted this 
writer with a smile.  Appears to be in a better mood today.  She remained isolative to her 
room for the majority of the evening.  Offered pt snack but she refused.  She does states 
that she ate all of her dinner.  She was cooperative during 1:1 physical assessment and took 
her HS medication without any issues.  She denies any S/I, H/I, A/VH, and also is not having 
any anxiety.  She does not appear to be internally preoccupied.  Pt reports very good sleep. 
 She was not observed out of her room for the rest of the evening or night.  

  

S/I, H/I: Denies both. 

A/VH: Denies 

Sleep: Currently sleeping, see sleep assessment for total hours 

ADL's: Independent 

Group attendance: None during night shift 

Were meds taken: Yes

Any med S/E: None observed or reported. 





Mental Status Exam



Appearance: Clean, short, buzzed hair, wearing green unit scrubs.

Eye contact: Direct, good 

Behavior: Cooperative, isolative, pleasant, smiling  

Speech: Soft, clear, minimal, normal rate and rhythm 

Mood: Fatigued

Affect: Blunted with some brightening 

Thought process: Circumstantial 

Thought Content:  Meds, discharge 

Cognition: A&O X3 (reason here). 

Insight: Fair 

Judgment: Poor



Interventions



PRN's used: None



Therapeutic interventions: Maintained a safe and therapeutic environment, provided clear and 
simple instructions, encouraged ADLs and provided positive encouragement, provided 
medication education, monitored/encouraged meal/fluid intake, encouraged eating in the Group 
Room and ambulation on the unit, and maintained Q 15 min safety checks.



Restraints/seclusion/emergency medication: N/A



Justification of Continued Inpatient Treatment: Patients psychosis is improving and a safe 
discharge plan is being developed. Patient will continue with therapeutic milieu.

## 2020-09-13 NOTE — NUR
Nursing Progress Note:



Legal hold: 5270 Expires 10/3.



Client on involuntary status for GD



Report received from nurse with use of SBAR: YAMILKA Nevarez



Why are they here:  This is a 65-year-old female who was brought to the Emergency Room via 
EMS. She was found in a pile of dirt and leaves on the ground. The patient is somewhat 
confused and having auditory hallucinations. The patient has a history of schizophrenia, 
homelessness, and was weak and exhausted as reported by the ER nursing staff. Patient was 
then admitted to the Neuro floor where she was treated for heat exhaustion, metabolic 
encephalopathy, hypokalemia, and head lice. A 5150 was placed for GD. On admission, patient 
is delusional, she explains to this writer that she has been excluded from the "[facility] 
[facility]." Patient states she hears voices but will not elaborate on what they are saying. 



Assessment:



What happened this shift: Patient attended all meals. Patient has a quiet but pleasant 
demeanor. She mostly sits in her room but will walk in the halls from time to time. Patient 
asked for a different set of clothes.  

  

S/I, H/I: Denies both. 

A/VH: Denies 

Sleep: No naps today 

ADL's: Independent 

Group attendance: No group activity today 

Were meds taken: Yes

Any med S/E: None observed or reported. 





Mental Status Exam



Appearance: Clean, short, buzzed hair, wearing green unit scrubs.

Eye contact: Direct, good 

Behavior: Cooperative, isolative, pleasant, smiling  

Speech: Soft, clear, minimal, normal rate and rhythm 

Mood: Happy

Affect: Quiet 

Thought process: Linear

Thought Content:  

Cognition: A&O X3 (reason here). 

Insight: Fair 

Judgment: Poor



Interventions



PRN's used: None



Therapeutic interventions: Maintained a safe and therapeutic environment, provided clear and 
simple instructions, encouraged ADLs and provided positive encouragement, provided 
medication education, monitored/encouraged meal/fluid intake, encouraged eating in the Group 
Room and ambulation on the unit, and maintained Q 15 min safety checks.



Restraints/seclusion/emergency medication: N/A



Justification of Continued Inpatient Treatment: Patients psychosis is improving and a safe 
discharge plan is being developed. Patient will continue with therapeutic milieu

## 2020-09-14 VITALS — DIASTOLIC BLOOD PRESSURE: 60 MMHG | SYSTOLIC BLOOD PRESSURE: 112 MMHG

## 2020-09-14 VITALS — DIASTOLIC BLOOD PRESSURE: 69 MMHG | SYSTOLIC BLOOD PRESSURE: 124 MMHG

## 2020-09-14 RX ADMIN — VENLAFAXINE HYDROCHLORIDE SCH MG: 37.5 CAPSULE, EXTENDED RELEASE ORAL at 08:04

## 2020-09-14 RX ADMIN — PALIPERIDONE SCH MG: 3 TABLET, EXTENDED RELEASE ORAL at 20:00

## 2020-09-14 RX ADMIN — Medication SCH CAN: at 08:08

## 2020-09-14 RX ADMIN — PALIPERIDONE SCH MG: 3 TABLET, EXTENDED RELEASE ORAL at 08:04

## 2020-09-14 RX ADMIN — Medication SCH CAN: at 13:29

## 2020-09-14 RX ADMIN — Medication SCH CAN: at 18:09

## 2020-09-14 NOTE — NUR
Nursing Progress Note:



Legal hold: 5270 Expires 10/3.



Client on involuntary status for GD



Report received from nurse with use of SBAR: YAMILKA Nevarez



Why are they here:  This is a 65-year-old female who was brought to the Emergency Room via 
EMS. She was found in a pile of dirt and leaves on the ground. The patient is somewhat 
confused and having auditory hallucinations. The patient has a history of schizophrenia, 
homelessness, and was weak and exhausted as reported by the ER nursing staff. Patient was 
then admitted to the Neuro floor where she was treated for heat exhaustion, metabolic 
encephalopathy, hypokalemia, and head lice. A 5150 was placed for GD. On admission, patient 
is delusional, she explains to this writer that she has been excluded from the "[facility] 
[facility]." Patient states she hears voices but will not elaborate on what they are saying. 



Assessment:



What happened this shift: Pt was laying in bed sleeping at change of shift. She denies 
depression and smiles, she states "Im just very tired." Pt is med compliant and encouraged 
to get out of bed for a snack but patient declined. 

S/I, H/I: Denies both. 

A/VH: Denies 

Sleep: No naps today 

ADL's: Independent 

Group attendance: No 

Were meds taken: Yes

Any med S/E: None observed or reported. 





Mental Status Exam



Appearance: Clean, short, buzzed hair, wearing green unit scrubs.

Eye contact: Direct, good 

Behavior: Cooperative, isolative, pleasant, smiling  

Speech: Soft, poverty of speech

Mood: Depressed

Affect: Flat, but smiles when you speak to her 

Thought process: Linear

Thought Content:  unable to assess pt gives minimal response to questions 

Cognition: A&O X3 (reason here). 

Insight: Fair 

Judgment: Poor



Interventions



PRN's used: None



Therapeutic interventions: Maintained a safe and therapeutic environment, provided clear and 
simple instructions, encouraged ADLs and provided positive encouragement, provided 
medication education, monitored/encouraged meal/fluid intake, encouraged eating in the Group 
Room and ambulation on the unit, and maintained Q 15 min safety checks.



Restraints/seclusion/emergency medication: N/A



Justification of Continued Inpatient Treatment: Patients psychosis is improving and a safe 
discharge plan is being developed. Patient will continue with therapeutic milieu

## 2020-09-14 NOTE — NUR
Nursing Progress Note:



Legal hold: 5270 Expires 10/3.



Client on involuntary status for GD



Report received from nurse with use of SBAR: YAMILKA Nevarez



Why are they here:  This is a 65-year-old female who was brought to the Emergency Room via 
EMS. She was found in a pile of dirt and leaves on the ground. The patient is somewhat 
confused and having auditory hallucinations. The patient has a history of schizophrenia, 
homelessness, and was weak and exhausted as reported by the ER nursing staff. Patient was 
then admitted to the Neuro floor where she was treated for heat exhaustion, metabolic 
encephalopathy, hypokalemia, and head lice. A 5150 was placed for GD. On admission, patient 
is delusional, she explains to this writer that she has been excluded from the "[facility] 
[facility]." Patient states she hears voices but will not elaborate on what they are saying. 



Assessment:



What happened this shift: Very pleasant, always had a smile, sang Comfort my belle to me.  
She wanted to do some laundry so an aide helped her to do that.  Cooperative with meds and 
assessment.

  

S/I, H/I: Denies both. 

A/VH: Denies 

Sleep: No naps today 

ADL's: Independent 

Group attendance: No group activity today 

Were meds taken: Yes

Any med S/E: None observed or reported. 





Mental Status Exam



Appearance: Clean, short, buzzed hair, wearing green unit scrubs.

Eye contact: Direct, good 

Behavior: Cooperative, isolative, pleasant, smiling  

Speech: Soft, clear, minimal, normal rate and rhythm 

Mood: Happy

Affect: Quiet 

Thought process: Linear

Thought Content:  

Cognition: A&O X3 (reason here). 

Insight: Fair 

Judgment: Poor



Interventions



PRN's used: None



Therapeutic interventions: Maintained a safe and therapeutic environment, provided clear and 
simple instructions, encouraged ADLs and provided positive encouragement, provided 
medication education, monitored/encouraged meal/fluid intake, encouraged eating in the Group 
Room and ambulation on the unit, and maintained Q 15 min safety checks.



Restraints/seclusion/emergency medication: N/A



Justification of Continued Inpatient Treatment: Patients psychosis is improving and a safe 
discharge plan is being developed. Patient will continue with therapeutic milieu

## 2020-09-14 NOTE — NUR
Nursing Progress Note:



Legal hold: 5270 Expires 10/3.



Client on involuntary status for GD



Report received from nurse with use of SBAR: YAMILKA Nevarez



Why are they here:  This is a 65-year-old female who was brought to the Emergency Room via 
EMS. She was found in a pile of dirt and leaves on the ground. The patient is somewhat 
confused and having auditory hallucinations. The patient has a history of schizophrenia, 
homelessness, and was weak and exhausted as reported by the ER nursing staff. Patient was 
then admitted to the Neuro floor where she was treated for heat exhaustion, metabolic 
encephalopathy, hypokalemia, and head lice. A 5150 was placed for GD. On admission, patient 
is delusional, she explains to this writer that she has been excluded from the "[place] 
[place]." Patient states she hears voices but will not elaborate on what they are saying. 



Assessment:



What happened this shift: Patient was asleep at change of shift and up for breakfast.  
Patient is pleasant and takes her medication as prescribed.  Patient isolates in her room 
but gets along with her roommate.  Patient often sits at the window during the day.  patient 
does not attend groups.  Patient is depressed but denies suicidal/homicidal ideation.  
Patient denies audio/visual hallucinations.  Patient is possible going to the [facility] when 
discharged.  

  

S/I, H/I: Denies both. 

A/VH: Denies 

Sleep: No naps today 

ADL's: Independent 

Group attendance: No 

Were meds taken: Yes

Any med S/E: None observed or reported. 





Mental Status Exam



Appearance: Clean, short, buzzed hair, wearing green unit scrubs.

Eye contact: Direct, good 

Behavior: Cooperative, isolative, pleasant, smiling  

Speech: Soft, poverty of speech

Mood: Depressed

Affect: Flat, but smiles when you speak to her 

Thought process: Linear

Thought Content:  difficult to ascertain

Cognition: A&O X3 (reason here). 

Insight: Fair 

Judgment: Poor



Interventions



PRN's used: None



Therapeutic interventions: Maintained a safe and therapeutic environment, provided clear and 
simple instructions, encouraged ADLs and provided positive encouragement, provided 
medication education, monitored/encouraged meal/fluid intake, encouraged eating in the Group 
Room and ambulation on the unit, and maintained Q 15 min safety checks.



Restraints/seclusion/emergency medication: N/A



Justification of Continued Inpatient Treatment: Patients psychosis is improving and a safe 
discharge plan is being developed. Patient will continue with therapeutic milieu

## 2020-09-15 VITALS — SYSTOLIC BLOOD PRESSURE: 127 MMHG | DIASTOLIC BLOOD PRESSURE: 78 MMHG

## 2020-09-15 VITALS — DIASTOLIC BLOOD PRESSURE: 83 MMHG | SYSTOLIC BLOOD PRESSURE: 136 MMHG

## 2020-09-15 RX ADMIN — PALIPERIDONE SCH MG: 3 TABLET, EXTENDED RELEASE ORAL at 08:44

## 2020-09-15 RX ADMIN — VENLAFAXINE HYDROCHLORIDE SCH MG: 37.5 CAPSULE, EXTENDED RELEASE ORAL at 08:43

## 2020-09-15 RX ADMIN — PALIPERIDONE SCH MG: 3 TABLET, EXTENDED RELEASE ORAL at 20:14

## 2020-09-15 RX ADMIN — Medication SCH CAN: at 12:59

## 2020-09-15 RX ADMIN — Medication SCH CAN: at 08:44

## 2020-09-15 RX ADMIN — Medication SCH CAN: at 18:37

## 2020-09-15 NOTE — NUR
Nursing Progress Note:



Legal hold: 5270 Expires 10/3.



Client on involuntary status for GD



Report received from nurse with use of SBAR: YAMILKA Colón



Why are they here:  This is a 65-year-old female who was brought to the Emergency Room via 
EMS. She was found in a pile of dirt and leaves on the ground. The patient is somewhat 
confused and having auditory hallucinations. The patient has a history of schizophrenia, 
homelessness, and was weak and exhausted as reported by the ER nursing staff. Patient was 
then admitted to the Neuro floor where she was treated for heat exhaustion, metabolic 
encephalopathy, hypokalemia, and head lice. A 5150 was placed for GD. On admission, patient 
is delusional, she explains to this writer that she has been excluded from the "[place] 
[place]." Patient states she hears voices but will not elaborate on what they are saying. 



Assessment:



What happened this shift:  Received patient sleeping in her bed at shift change, covers are 
pulled up to her neck and face is exposed. Pt wakes for breakfast and is cooperative and 
pleasant. No issues with compliance of care or medications.  Patient declined AM group, but 
did attend PM Art group and engaged in the session. Patient is more visible on the unit and 
socializing more with her peers. Pt appears to get along well with her roommate and is 
observed several times sitting in the chair conversing and smiling. Pt states she is happy, 
but a little nervous about her [facility] interview tomorrow. Pt is worried about not having any 
clothes. Some clothes from the clothes closet were provided to pt.  Pt denies SI/HI/AH/VH.  
Pt states she has an interview with [facility] tomorrow at 1300. 

  

S/I, H/I: Denies both. 

A/VH: Denies both. 

Sleep: 10.5 hrs. per Sleep Assessment. No naps today 

ADL's: Independent, with some prompting.

Group attendance: Yes, PM group. 

Were meds taken: Yes, without hesitation.

Any med S/E: None observed or reported. 





Mental Status Exam



Appearance: Clean, short, buzzed hair, wearing green unit scrubs, with a sweatshirt 
underneath top. 

Eye contact: Good 

Behavior: Cooperative, social, pleasant, smiling  

Speech: Soft, minimal, normal rate/rhythm. 

Mood: Seems hopeful. 

Affect: Bright

Thought process: Linear, some delayed thoughts

Thought Content:  Situational.

Cognition: A&O X3 (reason here). 

Insight: Fair 

Judgment: Poor



Interventions



PRN's used: None



Therapeutic interventions: Maintained a safe and therapeutic environment, provided clear and 
simple instructions, encouraged ADLs and provided positive encouragement, provided 
medication education, monitored/encouraged meal/fluid intake: Q 15 min safety checks.



Restraints/seclusion/emergency medication: N/A



Justification of Continued Inpatient Treatment: Patients psychosis is improving and a safe 
discharge plan is being developed. Patient will continue with therapeutic milieu. Patient 
has [facility] interview tomorrow.

## 2020-09-15 NOTE — NUR
David Progress Note:



Legal hold: 5270 Expires 10/3.



Client on involuntary status for GD



Report received from nurse with use of SBAR: YAMILKA Nevarez



Why are they here:  This is a 65-year-old female who was brought to the Emergency Room via 
EMS. She was found in a pile of dirt and leaves on the ground. The patient is somewhat 
confused and having auditory hallucinations. The patient has a history of schizophrenia, 
homelessness, and was weak and exhausted as reported by the ER nursing staff. Patient was 
then admitted to the Neuro floor where she was treated for heat exhaustion, metabolic 
encephalopathy, hypokalemia, and head lice. A 5150 was placed for GD. On admission, patient 
is delusional, she explains to this writer that she has been excluded from the "[place] 
[place]." Patient states she hears voices but will not elaborate on what they are saying. 



Assessment:



What happened this shift:  Pt was sitting in her chair smiling at change of shift. Pt states 
she is feeling good. She is apprehensive about her interview tomorrow for the [facility] and is 
hopeful she will get accepted. Pt reports having a BM today and declines any prns for 
constipation. Pt reports she has no needs at this time. She spent evening sitting in her 
chair, sat in the rec room for a short time before returning to her room.

  

S/I, H/I: Denies both. 

A/VH: Denies both. 

Sleep: see sleep hours 

ADL's: Independent, with some prompting.

Group attendance: attended snack time in group room 

Were meds taken: Yes 

Any med S/E: None observed or reported. 





Mental Status Exam



Appearance: Clean, short, buzzed hair, wearing green unit scrubs, with a sweatshirt 
underneath top. 

Eye contact: Good 

Behavior: Cooperative, social, pleasant, smiling  

Speech: Soft, minimal, normal rate/rhythm. 

Mood: Seems hopeful. 

Affect: Bright

Thought process: Linear, thought blocking

Thought Content:  crrc 

Cognition: A&O X3 

Insight: Fair 

Judgment: Poor



Interventions



PRN's used: None



Therapeutic interventions: Maintained a safe and therapeutic environment, provided clear and 
simple instructions, encouraged ADLs and provided positive encouragement, provided 
medication education, monitored/encouraged meal/fluid intake: Q 15 min safety checks.



Restraints/seclusion/emergency medication: N/A



Justification of Continued Inpatient Treatment: Patients psychosis is improving and a safe 
discharge plan is being developed. Patient will continue with therapeutic milieu. Patient 
has [facility] interview tomorrow.

## 2020-09-16 VITALS — DIASTOLIC BLOOD PRESSURE: 88 MMHG | SYSTOLIC BLOOD PRESSURE: 132 MMHG

## 2020-09-16 VITALS — DIASTOLIC BLOOD PRESSURE: 83 MMHG | SYSTOLIC BLOOD PRESSURE: 126 MMHG

## 2020-09-16 RX ADMIN — PALIPERIDONE SCH MG: 3 TABLET, EXTENDED RELEASE ORAL at 20:16

## 2020-09-16 RX ADMIN — VENLAFAXINE HYDROCHLORIDE SCH MG: 37.5 CAPSULE, EXTENDED RELEASE ORAL at 08:18

## 2020-09-16 RX ADMIN — Medication SCH CAN: at 13:41

## 2020-09-16 RX ADMIN — PALIPERIDONE SCH MG: 3 TABLET, EXTENDED RELEASE ORAL at 08:19

## 2020-09-16 RX ADMIN — Medication SCH CAN: at 18:01

## 2020-09-16 RX ADMIN — Medication SCH CAN: at 08:20

## 2020-09-16 NOTE — NUR
Reassessment: Noted that patient's diet order was completed, d/w RN. 

Patient continues with average % PO intake of meals. ONS was changed 

to Ensure High Protein TID and pt documented with 100% PO intake of ONS. 

Pt meeting nutrient needs at this time. LBM 9/16. No nutrition 

intervention warranted at this time. Will continue to follow.

Recommendations:

1) Continue regular diet

2) Encourage PO intake

3) Ensure High Protein TID; consider discontinuing ONS given consistently 

good PO intake of meals

4) Bowel care PRN

5) Scaled weights per rx

-------------------------------------------------------------------------------

Addendum: 09/16/20 at 1204 by Dariana Fuentes RD

-------------------------------------------------------------------------------

Amended: Links added.

## 2020-09-16 NOTE — NUR
Nursing Progress Note:

Legal hold: 5270 Expires 10/3.

Client on involuntary status for GD

Report received from nurse with use of SBAR: YAMILKA Colón

Why are they here:  This is a 65-year-old female who was brought to the Emergency Room via 
EMS. She was found in a pile of dirt and leaves on the ground. The patient is somewhat 
confused and having auditory hallucinations. The patient has a history of schizophrenia, 
homelessness, and was weak and exhausted as reported by the ER nursing staff. Patient was 
then admitted to the Neuro floor where she was treated for heat exhaustion, metabolic 
encephalopathy, hypokalemia, and head lice. A 5150 was placed for GD. On admission, patient 
is delusional, she explains to this writer that she has been excluded from the "[place] 
[place]." Patient states she hears voices but will not elaborate on what they are saying. 

Assessment: Pt. is asleep at start of shift. Pt. awake for breakfast. Pt. took all 
medications and ate all meals. 1:1 done at bedside. Pt. denies SI/HI, A/V hallucinations. 
Pt. is guarded, offers minimal information to this RN during interview. Pt. reports she is 
hopeful about going to the [facility]. Pt. found in her room often, sitting at her window and 
talking with her roommate. Pt. refused her PPD test today, stating that she always has a 
false-positive. CXR ordered instead. CXR showed no active TB. Pt. approached this RN with a 
smile stating, I will be discharged Monday. Pt. reports she feels good about this. 

What happened this shift:   Pt was laying in bed at change of shift, she is smiling and 
pleasant. Getting along well with her roommate and they socialize with each other. Pt states 
she is in a good mood and is "hopeful" looking forward to going to the [facility]. Pt came to the 
nurses station twice to ask for nicotine lozenges for her roommate. Explained to patient 
that she does not need to come up for her roomate, instructed patients roommate to use call 
light. 

S/I, H/I: Denies 

A/VH: Denies

Sleep: resting in bed at change 

ADL's: Independent

Group attendance: No 

Were meds taken: Yes

Any med S/E: Denies 



Mental Status Exam

Appearance: Clean, short, buzzed hair, wearing green unit scrubs, with a sweatshirt 
underneath top. 

Eye contact: Good 

Behavior: Cooperative, guarded, isolates to room. 

Speech: Soft, minimal, normal rate/rhythm. 

Mood: Guarded with brightening. 

Affect: Congruent with mood

Thought process: Linear, some thought blocking 

Thought Content:  Hopeful for discharge to [facility]. 

Cognition: A&O X4

Insight: Fair 

Judgment: Fair

Interventions

PRN's used: None

Therapeutic interventions: Maintained a safe and therapeutic environment, provided clear and 
simple instructions, encouraged ADLs and provided positive encouragement, provided 
medication education, monitored/encouraged meal/fluid intake: Q 15 min safety checks.

Restraints/seclusion/emergency medication: N/A

Justification of Continued Inpatient Treatment: Patients psychosis is improving and a safe 
discharge plan is being developed. Patient will continue with therapeutic milieu. Patient 
has [facility] interview tomorrow.

## 2020-09-16 NOTE — NUR
Nursing Progress Note:

Legal hold: 5270 Expires 10/3.

Client on involuntary status for GD

Report received from nurse with use of SBAR: YAMILKA Colón

Why are they here:  This is a 65-year-old female who was brought to the Emergency Room via 
EMS. She was found in a pile of dirt and leaves on the ground. The patient is somewhat 
confused and having auditory hallucinations. The patient has a history of schizophrenia, 
homelessness, and was weak and exhausted as reported by the ER nursing staff. Patient was 
then admitted to the Neuro floor where she was treated for heat exhaustion, metabolic 
encephalopathy, hypokalemia, and head lice. A 5150 was placed for GD. On admission, patient 
is delusional, she explains to this writer that she has been excluded from the "[place] 
[place]." Patient states she hears voices but will not elaborate on what they are saying. 

Assessment: Pt. is asleep at start of shift. Pt. awake for breakfast. Pt. took all 
medications and ate all meals. 1:1 done at bedside. Pt. denies SI/HI, A/V hallucinations. 
Pt. is guarded, offers minimal information to this RN during interview. Pt. reports she is 
hopeful about going to the [facility]. Pt. found in her room often, sitting at her window and 
talking with her roommate. Pt. refused her PPD test today, stating that she always has a 
false-positive. CXR ordered instead. CXR showed no active TB. Pt. approached this RN with a 
smile stating, I will be discharged Monday. Pt. reports she feels good about this. 

What happened this shift:   

S/I, H/I: Denies 

A/VH: Denies

Sleep: Pt. did not nap on day shift. 

ADL's: Independent

Group attendance: No 

Were meds taken: Yes

Any med S/E: Denies 

Mental Status Exam

Appearance: Clean, short, buzzed hair, wearing green unit scrubs, with a sweatshirt 
underneath top. 

Eye contact: Good 

Behavior: Cooperative, guarded, isolates to room. 

Speech: Soft, minimal, normal rate/rhythm. 

Mood: Guarded with brightening. 

Affect: Congruent with mood

Thought process: Linear, some thought blocking 

Thought Content:  Hopeful for discharge to [facility]. 

Cognition: A&O X4

Insight: Fair 

Judgment: Fair

Interventions

PRN's used: None

Therapeutic interventions: Maintained a safe and therapeutic environment, provided clear and 
simple instructions, encouraged ADLs and provided positive encouragement, provided 
medication education, monitored/encouraged meal/fluid intake: Q 15 min safety checks.

Restraints/seclusion/emergency medication: N/A

Justification of Continued Inpatient Treatment: Patients psychosis is improving and a safe 
discharge plan is being developed. Patient will continue with therapeutic milieu. Patient 
has [facility] interview tomorrow.

## 2020-09-16 NOTE — NUR
ACCEPTED AT [facility]



Breanne interviewed and has been accepted at [facility].



CASSANDRA Aguilar

## 2020-09-17 VITALS — SYSTOLIC BLOOD PRESSURE: 105 MMHG | DIASTOLIC BLOOD PRESSURE: 74 MMHG

## 2020-09-17 VITALS — DIASTOLIC BLOOD PRESSURE: 77 MMHG | SYSTOLIC BLOOD PRESSURE: 123 MMHG

## 2020-09-17 RX ADMIN — VENLAFAXINE HYDROCHLORIDE SCH MG: 37.5 CAPSULE, EXTENDED RELEASE ORAL at 08:35

## 2020-09-17 RX ADMIN — PALIPERIDONE SCH MG: 3 TABLET, EXTENDED RELEASE ORAL at 08:35

## 2020-09-17 RX ADMIN — Medication SCH CAN: at 18:00

## 2020-09-17 RX ADMIN — PALIPERIDONE SCH MG: 3 TABLET, EXTENDED RELEASE ORAL at 20:08

## 2020-09-17 RX ADMIN — Medication SCH CAN: at 13:20

## 2020-09-17 RX ADMIN — Medication SCH CAN: at 08:36

## 2020-09-17 NOTE — NUR
Nursing Progress Note:

Legal hold: 5270 Expires 10/3.

Client on involuntary status for GD

Report received from nurse with use of SBAR: YAMILKA Colón

Why are they here:  This is a 65-year-old female who was brought to the Emergency Room via 
EMS. She was found in a pile of dirt and leaves on the ground. The patient is somewhat 
confused and having auditory hallucinations. The patient has a history of schizophrenia, 
homelessness, and was weak and exhausted as reported by the ER nursing staff. Patient was 
then admitted to the Neuro floor where she was treated for heat exhaustion, metabolic 
encephalopathy, hypokalemia, and head lice. A 5150 was placed for GD. On admission, patient 
is delusional, she explains to this writer that she has been excluded from the "[place] 
[place]." Patient states she hears voices but will not elaborate on what they are saying. 

Assessment: Pt. is asleep at start of shift. Pt. awake for breakfast. Pt. took all 
medications and ate all meals in the community room. 1:1 done at bedside. Pt.s affect is 
brighter. Pt. reports she is good, and that she slept well. Pt.s responses to RNs 
questions are minimal. Pt. denies SI/HI, A/V hallucinations. Pt. is hopeful about her 
discharge on Monday to the [facility]. Pt. found frequently sitting at her window talking with her 
roommate or on the phone. Pt. did not attend groups, when asked about attending groups pt. 
states, I will tomorrow, I had to make phone calls today. Pt.s roommate was discharged, 
RN asked pt. if she felt lonely because her roommate left, pt. reports, No, Ill see her at 
the [facility] soon. Pt. isolates to her room.

What happened this shift:   

S/I, H/I: Denies 

A/VH: Denies

Sleep: Pt. did not nap on day shift. 

ADL's: Independent

Group attendance: No 

Were meds taken: Yes

Any med S/E: Denies 

Mental Status Exam

Appearance: Clean, short, buzzed hair, wearing green unit scrubs, with a sweatshirt 
underneath top. 

Eye contact: Good 

Behavior: Cooperative, isolates to room, socializing with roommate. 

Speech: Soft, minimal, normal rate/rhythm. 

Mood: Euthymic 

Affect: Congruent with mood

Thought process: Linear, some thought blocking 

Thought Content:  Hopeful for discharge to [facility]. 

Cognition: A&O X4

Insight: Fair 

Judgment: Fair

Interventions

PRN's used: None

Therapeutic interventions: Maintained a safe and therapeutic environment, provided clear and 
simple instructions, encouraged ADLs and provided positive encouragement, provided 
medication education, monitored/encouraged meal/fluid intake: Q 15 min safety checks.

Restraints/seclusion/emergency medication: N/A

Justification of Continued Inpatient Treatment: Patients psychosis is improving and a safe 
discharge plan is being developed. Patient will continue with therapeutic milieu. Patient 
has [facility] interview tomorrow.

## 2020-09-18 VITALS — DIASTOLIC BLOOD PRESSURE: 86 MMHG | SYSTOLIC BLOOD PRESSURE: 128 MMHG

## 2020-09-18 VITALS — SYSTOLIC BLOOD PRESSURE: 119 MMHG | DIASTOLIC BLOOD PRESSURE: 72 MMHG

## 2020-09-18 RX ADMIN — PALIPERIDONE SCH MG: 3 TABLET, EXTENDED RELEASE ORAL at 20:11

## 2020-09-18 RX ADMIN — PALIPERIDONE SCH MG: 3 TABLET, EXTENDED RELEASE ORAL at 07:48

## 2020-09-18 RX ADMIN — Medication SCH CAN: at 13:42

## 2020-09-18 RX ADMIN — Medication SCH CAN: at 08:12

## 2020-09-18 RX ADMIN — VENLAFAXINE HYDROCHLORIDE SCH MG: 37.5 CAPSULE, EXTENDED RELEASE ORAL at 07:49

## 2020-09-18 RX ADMIN — Medication SCH CAN: at 18:10

## 2020-09-18 NOTE — NUR
Nursing Progress Note:



Legal hold: 5270 Expires 10/3.



Client on involuntary status for GD



Report received from nurse with use of SBAR: YAMILKA Case



Why are they here:  This is a 65-year-old female who was brought to the Emergency Room via 
EMS. She was found in a pile of dirt and leaves on the ground. The patient is somewhat 
confused and having auditory hallucinations. The patient has a history of schizophrenia, 
homelessness, and was weak and exhausted as reported by the ER nursing staff. Patient was 
then admitted to the Neuro floor where she was treated for heat exhaustion, metabolic 
encephalopathy, hypokalemia, and head lice. A 5150 was placed for GD. On admission, patient 
is delusional, she explains to this writer that she has been excluded from the "[place] 
[place]." Patient states she hears voices but will not elaborate on what they are saying. 



Assessment: 

What happened this shift:   Pt isolated to her room all evening, lying in her bed in the 
fetal position.  Pt was difficult to arouse but was friendly and cooperative when awakened.  
Pt denied having any complaints and denied having any needs at this time.  Pt did not attend 
snack, but did accept hs meds without issue.  



S/I, H/I: Denies 

A/VH: Denies

Sleep: resting in bed at change 

ADL's: Independent

Group attendance: No 

Were meds taken: Yes

Any med S/E: Denies 



Mental Status Exam

Appearance: Clean, short, buzzed hair, lying in bed

Eye contact: Good 

Behavior: Cooperative, guarded, isolates to room. 

Speech: Soft, minimal, normal rate/rhythm. 

Mood: Guarded

Affect: Congruent with mood

Thought process: Linear, some thought blocking 

Thought Content:  Hopeful for discharge to [facility]. 

Cognition: A&O X4

Insight: Fair 

Judgment: Fair



Interventions



PRN's used: None



Therapeutic interventions: Maintained a safe and therapeutic environment, provided clear and 
simple instructions, encouraged ADLs and provided positive encouragement, provided 
medication education, monitored/encouraged meal/fluid intake: Q 15 min safety checks.



Restraints/seclusion/emergency medication: N/A



Justification of Continued Inpatient Treatment: Patients psychosis is improving and a safe 
discharge plan is being developed. Patient will continue with therapeutic milieu. Patient 
has [facility] interview tomorrow.

## 2020-09-18 NOTE — NUR
Nursing Progress Note:

Legal hold: 5270 Expires 10/3.

Client on involuntary status for GD

Report received from nurse with use of SBAR: YAMILKA Colón

Why are they here:  This is a 65-year-old female who was brought to the Emergency Room via 
EMS. She was found in a pile of dirt and leaves on the ground. The patient is somewhat 
confused and having auditory hallucinations. The patient has a history of schizophrenia, 
homelessness, and was weak and exhausted as reported by the ER nursing staff. Patient was 
then admitted to the Neuro floor where she was treated for heat exhaustion, metabolic 
encephalopathy, hypokalemia, and head lice. A 5150 was placed for GD. On admission, patient 
is delusional, she explains to this writer that she has been excluded from the "[place] 
[place]." Patient states she hears voices but will not elaborate on what they are saying. 

Assessment: Pt. is asleep at start of shift. Pt. awake for breakfast. Pt. took all 
medications and ate all meals in the community room. 1:1 done at bedside. Pt. reports she is 
"good" but feels anxious regarding her planned discharge for Monday to the [facility] Pt. states, 
"I don't have an ID or my bank card so I don't know how I am going to get my medications". 
RN left message with pt.'s  for assistance in obtaining ID. Pt. isolated to her 
room most of the day, found sitting looking outside her window. Pt. states, "It's like a 
hotel, I have this place to myself". 

What happened this shift:   

S/I, H/I: Denies 

A/VH: Denies

Sleep: Pt. did not nap on day shift. 

ADL's: Independent

Group attendance: No 

Were meds taken: Yes

Any med S/E: Denies 

Mental Status Exam

Appearance: Clean, short, buzzed hair, wearing green unit scrubs, with a sweatshirt 
underneath top. 

Eye contact: Good 

Behavior: Cooperative, isolates to room, socializing with roommate. 

Speech: Soft, minimal, normal rate/rhythm. 

Mood: Euthymic 

Affect: Congruent with mood

Thought process: Linear, some thought blocking 

Thought Content:  Anxious about obtaining medications when she is discharged. 

Cognition: A&O X4

Insight: Fair 

Judgment: Fair

Interventions

PRN's used: None

Therapeutic interventions: Maintained a safe and therapeutic environment, provided clear and 
simple instructions, encouraged ADLs and provided positive encouragement, provided 
medication education, monitored/encouraged meal/fluid intake: Q 15 min safety checks.

Restraints/seclusion/emergency medication: N/A

Justification of Continued Inpatient Treatment: Patients psychosis is improving and a safe 
discharge plan is being developed. Patient will continue with therapeutic milieu. Patient 
has [facility] interview tomorrow.

## 2020-09-18 NOTE — NUR
CM-DCP



Presenting Issues: 

Pt's scheduled to d/c & transition to [facility] for additional support on Monday 9/18. 



Interventions: 

SS consulted w/attending physician, Dr. Roy and requested that pt's meds be finalized and 
e-scripted to [facility] on [address].  SS will  and deliver to pt on Saturday. 



SS met w/pt and finalized dcp, dcp entered into MDT. 



Plan: 

SS to  pt's meds on Saturday & bring it to [facility]. Pt will d/c Monday to [facility]. 

Katalina Zacarias LCSW

-------------------------------------------------------------------------------

Addendum: 09/18/20 at 1234 by Katalina Zacarias 

-------------------------------------------------------------------------------

Amended: Links added.

## 2020-09-19 VITALS — DIASTOLIC BLOOD PRESSURE: 74 MMHG | SYSTOLIC BLOOD PRESSURE: 115 MMHG

## 2020-09-19 VITALS — SYSTOLIC BLOOD PRESSURE: 130 MMHG | DIASTOLIC BLOOD PRESSURE: 86 MMHG

## 2020-09-19 RX ADMIN — Medication SCH CAN: at 13:00

## 2020-09-19 RX ADMIN — PALIPERIDONE SCH MG: 3 TABLET, EXTENDED RELEASE ORAL at 08:18

## 2020-09-19 RX ADMIN — PALIPERIDONE SCH MG: 3 TABLET, EXTENDED RELEASE ORAL at 20:29

## 2020-09-19 RX ADMIN — VENLAFAXINE HYDROCHLORIDE SCH MG: 37.5 CAPSULE, EXTENDED RELEASE ORAL at 08:17

## 2020-09-19 RX ADMIN — Medication SCH CAN: at 08:00

## 2020-09-19 RX ADMIN — Medication SCH CAN: at 17:31

## 2020-09-19 NOTE — NUR
Nursing Progress Note:



Legal hold: 5270 Expires 10/3.



Client on involuntary status for GD



Report received from nurse with use of SBAR: YAMILKA Case



Why are they here:  This is a 65-year-old female who was brought to the Emergency Room via 
EMS. She was found in a pile of dirt and leaves on the ground. The patient is somewhat 
confused and having auditory hallucinations. The patient has a history of schizophrenia, 
homelessness, and was weak and exhausted as reported by the ER nursing staff. Patient was 
then admitted to the Neuro floor where she was treated for heat exhaustion, metabolic 
encephalopathy, hypokalemia, and head lice. A 5150 was placed for GD. On admission, patient 
is delusional, she explains to this writer that she has been excluded from the "King[place] 
[place]." Patient states she hears voices but will not elaborate on what they are saying. 



Assessment: 

What happened this shift:   Pt isolated to her room all evening, lying in her bed in the 
fetal position, as she did last night.  pt is difficult to arouse and when awakens she jumps 
and is visibly afraid.  Pt calmed down quickly and was cooperative for 1:1.  Pt is very 
friendly and smiles a lot during assessment, denying having any needs at this time.  



S/I, H/I: Denies 

A/VH: Denies

Sleep: see sleep assessment

ADL's: Independent

Group attendance: No 

Were meds taken: Yes

Any med S/E: Denies 



Mental Status Exam

Appearance: shaved head, in personal clothes, wnl

Eye contact: Good 

Behavior: Cooperative, guarded, isolates to room. 

Speech: Soft, minimal, normal rate/rhythm. 

Mood: friendly, guarded

Affect: Congruent with mood

Thought process: Linear 

Thought Content:  Hopeful for discharge to [facility]. 

Cognition: A&O X4

Insight: Fair 

Judgment: Fair



Interventions



PRN's used: None



Therapeutic interventions: Maintained a safe and therapeutic environment, provided clear and 
simple instructions, encouraged ADLs and provided positive encouragement, provided 
medication education, monitored/encouraged meal/fluid intake: Q 15 min safety checks.



Restraints/seclusion/emergency medication: N/A



Justification of Continued Inpatient Treatment: Patients psychosis is improving and a safe 
discharge plan is being developed. Patient will continue with therapeutic milieu. Patient 
has [facility] interview tomorrow.

## 2020-09-19 NOTE — NUR
Nursing Progress Note  



Legal hold: 5270



Client on involuntary status for GD



Report received from RN with use of SBAR



Why are they here:  



This is a 65-year-old female who was brought to the Emergency Room via EMS. She was found in 
a pile of dirt and leaves on the ground. The patient is somewhat confused and having 
auditory hallucinations. The patient has a history of schizophrenia, homelessness, and was 
weak and exhausted as reported by the ER nursing staff. Patient was then admitted to the 
Neuro floor where she was treated for heat exhaustion, metabolic encephalopathy, 
hypokalemia, and head lice. A 5150 was placed for GD. On admission, patient is delusional, 
she explains to this writer that she has been excluded from the "Kingdom of [place]." Patient 
states she hears voices but will not elaborate on what they are saying. 



Assessment: 



What happened this shift:

Received Pt in bed sleeping w/o distress at beginning of shift. Pt cooperative with vitals 
and ate breakfast with others in community room. Pt returned to her room and napped and took 
AM meds after education provided. Pt in euthymic mood and discussed her potential discharge 
to [facility] on Monday. We discussed the resources she could connect with, and she was comforted 
by information about the facility. Pt smiling and walked unit throughout the day. She 
isolated a bit as well and remains guarded and pleasant.    



S/I, H/I: Denies 

A/VH: Denies

Sleep: Napped in morning

ADL's: Independent

Group attendance: Yes 

Were meds taken: Yes

Any med S/E: Denies 



Mental Status Exam

Appearance: Casual, well groomed

Eye contact: Good 

Behavior: Cooperative, isolates to room

Speech: Soft, minimal, normal rate/rhythm

Mood: Euthymic 

Affect: Congruent with mood

Thought process: Linear, some thought blocking 

Thought Content:  SEs related to stopping Effexor 

Cognition: A&O X4

Insight: Fair 

Judgment: Fair



Interventions



PRN's used: None



Therapeutic interventions: Maintained a safe and therapeutic environment, provided clear and 
simple instructions, encouraged ADLs and provided positive encouragement, provided 
medication education, monitored/encouraged meal/fluid intake: Q 15 min safety checks.



Restraints/seclusion/emergency medication: N/A



Justification of Continued Inpatient Treatment: Patients psychosis is improving and a safe 
discharge plan is being developed. Patient will continue with therapeutic milieu. Patient 
may discharge to [facility] on Monday.

## 2020-09-20 VITALS — SYSTOLIC BLOOD PRESSURE: 111 MMHG | DIASTOLIC BLOOD PRESSURE: 75 MMHG

## 2020-09-20 VITALS — SYSTOLIC BLOOD PRESSURE: 131 MMHG | DIASTOLIC BLOOD PRESSURE: 88 MMHG

## 2020-09-20 RX ADMIN — Medication SCH CAN: at 12:44

## 2020-09-20 RX ADMIN — Medication SCH CAN: at 08:04

## 2020-09-20 RX ADMIN — PALIPERIDONE SCH MG: 3 TABLET, EXTENDED RELEASE ORAL at 07:44

## 2020-09-20 RX ADMIN — PALIPERIDONE SCH MG: 3 TABLET, EXTENDED RELEASE ORAL at 20:10

## 2020-09-20 RX ADMIN — Medication SCH CAN: at 18:00

## 2020-09-20 RX ADMIN — VENLAFAXINE HYDROCHLORIDE SCH MG: 75 CAPSULE, EXTENDED RELEASE ORAL at 07:44

## 2020-09-20 NOTE — NUR
Nursing Progress Note:



Legal hold: 5270 Expires 10/3.



Client on involuntary status for GD



Report received from YAMILKA Case with use of SBAR



Why are they here:  This is a 65-year-old female who was brought to the Emergency Room via 
EMS. She was found in a pile of dirt and leaves on the ground. The patient is somewhat 
confused and having auditory hallucinations. The patient has a history of schizophrenia, 
homelessness, and was weak and exhausted as reported by the ER nursing staff. Patient was 
then admitted to the Neuro floor where she was treated for heat exhaustion, metabolic 
encephalopathy, hypokalemia, and head lice. A 5150 was placed for GD. On admission, patient 
is delusional, she explains to this writer that she has been excluded from the "King[place] 
[place]." Patient states she hears voices but will not elaborate on what they are saying. 



Assessment: 



What happened this shift:  The patient was seen at bedside for 1:1. She looks good and 
appears happy. She reports that she is happy and looks forward to discharging to the [facility]. 
"I here all the [facility] services are next door, so I can take advantage of them." Per her 
usual, she isolated to her room all night. Denies any wants or needs.





S/I, H/I: Denies 

A/VH: Denies

Sleep: see sleep assessment

ADL's: Independent

Group attendance: No 

Were meds taken: Yes

Any med S/E: None reported or observed. 



Mental Status Exam



Appearance: Middle aged Lady with shaved head, and wearing appropriate personal clothes.

Eye contact: Good 

Behavior: Cooperative, guarded, isolates to room. 

Speech: Normal. 

Mood: "good"

Affect: Congruent with mood

Thought process: Linear 

Thought Content:  Hopeful for discharge to [facility]. 

Cognition: A&O X4

Insight: Fair 

Judgment: Fair



Interventions



PRN's used: None



Therapeutic interventions: Maintained a safe and therapeutic environment, provided clear and 
simple instructions, encouraged ADLs and provided positive encouragement, provided 
medication education, monitored/encouraged meal/fluid intake: Q 15 min safety checks.



Restraints/seclusion/emergency medication: N/A



Justification of Continued Inpatient Treatment: Patients psychosis is improving and a safe 
discharge plan is being developed. Patient will continue with therapeutic milieu.

## 2020-09-20 NOTE — NUR
Nursing Progress Note:

Legal hold: 5270 Expires 10/3.

Client on involuntary status for GD

Report received from nurse with use of SBAR: Alesia Churchill RN

Why are they here:  This is a 65-year-old female who was brought to the Emergency Room via 
EMS. She was found in a pile of dirt and leaves on the ground. The patient is somewhat 
confused and having auditory hallucinations. The patient has a history of schizophrenia, 
homelessness, and was weak and exhausted as reported by the ER nursing staff. Patient was 
then admitted to the Neuro floor where she was treated for heat exhaustion, metabolic 
encephalopathy, hypokalemia, and head lice. A 5150 was placed for GD. On admission, patient 
is delusional, she explains to this writer that she has been excluded from the "[place] 
[place]." Patient states she hears voices but will not elaborate on what they are saying. 

Assessment: Pt. is asleep at start of shift. Pt. awake for breakfast. Pt. took all 
medications and ate all meals in the community room. 1:1 done at bedside. Pt. reports she is 
is doing well and that she is looking forward to going to the [facility] tomorrow. Pt. denies 
SI/HI, A/V hallucinations. Pt. seen doing chair yoga in the community room. Pt. isolates to 
her room most of the day. Pt. seen napping after lunch. In the afternoon pt. observed out of 
her room at intervals, watching TV. 

What happened this shift:   

S/I, H/I: Denies 

A/VH: Denies

Sleep: Napped x1 on day shift. 

ADL's: Independent

Group attendance: No 

Were meds taken: Yes

Any med S/E: Denies 

Mental Status Exam

Appearance: Clean, short, buzzed hair, wearing green unit scrubs and cardigan. 

Eye contact: Good 

Behavior: Cooperative, isolates to room.

Speech: Soft, minimal, normal rate/rhythm. 

Mood: Euthymic 

Affect: Congruent with mood

Thought process: Linear

Thought Content:  Future oriented about discharge on Monday. 

Cognition: A&O X4

Insight: Fair 

Judgment: Fair

Interventions

PRN's used: None

Therapeutic interventions: Maintained a safe and therapeutic environment, provided clear and 
simple instructions, encouraged ADLs and provided positive encouragement, provided 
medication education, monitored/encouraged meal/fluid intake: Q 15 min safety checks.

Restraints/seclusion/emergency medication: N/A

Justification of Continued Inpatient Treatment: Patients psychosis is improving and a safe 
discharge plan is being developed. Patient will continue with therapeutic milieu. Pt. to be 
discharged to [facility] tomorrow 9/21

## 2020-09-21 VITALS — SYSTOLIC BLOOD PRESSURE: 121 MMHG | DIASTOLIC BLOOD PRESSURE: 81 MMHG

## 2020-09-21 VITALS — SYSTOLIC BLOOD PRESSURE: 135 MMHG | DIASTOLIC BLOOD PRESSURE: 88 MMHG

## 2020-09-21 RX ADMIN — Medication SCH CAN: at 13:14

## 2020-09-21 RX ADMIN — Medication SCH CAN: at 08:06

## 2020-09-21 RX ADMIN — Medication SCH CAN: at 18:02

## 2020-09-21 RX ADMIN — PALIPERIDONE SCH MG: 3 TABLET, EXTENDED RELEASE ORAL at 07:53

## 2020-09-21 RX ADMIN — VENLAFAXINE HYDROCHLORIDE SCH MG: 75 CAPSULE, EXTENDED RELEASE ORAL at 07:53

## 2020-09-21 RX ADMIN — PALIPERIDONE SCH MG: 3 TABLET, EXTENDED RELEASE ORAL at 20:17

## 2020-09-21 NOTE — NUR
Nursing Progress Note:

Legal hold: 5270 Expires 10/3.

Client on involuntary status for GD

Report received from nurse with use of SBAR: Alesia Churchill RN

Why are they here:  This is a 65-year-old female who was brought to the Emergency Room via 
EMS. She was found in a pile of dirt and leaves on the ground. The patient is somewhat 
confused and having auditory hallucinations. The patient has a history of schizophrenia, 
homelessness, and was weak and exhausted as reported by the ER nursing staff. Patient was 
then admitted to the Neuro floor where she was treated for heat exhaustion, metabolic 
encephalopathy, hypokalemia, and head lice. A 5150 was placed for GD. On admission, patient 
is delusional, she explains to this writer that she has been excluded from the "King[place] 
[place]." Patient states she hears voices but will not elaborate on what they are saying. 

Assessment: 

What happened this shift:   

 Pt. is asleep at start of shift. Pt. awake for breakfast. Pt. took all medications and ate 
all meals in the community room. 1:1 done at bedside. Pt. reports she is is doing well and 
that she is looking forward to going to the [facility] today. Pt. denies SI/HI, A/V 
hallucinations. Pt. was scheduled to discharge today, however, pt.'s invega could not be 
obtained before going to the [facility]. Pt. reports she is disappointed but that she can wait 
another day. Pt. observed watching TV in the community room. Pt. socializes minimally with 
peers and staff. Pt. received new clothes today and was very pleased with them. 

S/I, H/I: Denies 

A/VH: Denies

Sleep: Napped x1 in the AM and afternoon. 

ADL's: Independent

Group attendance: No 

Were meds taken: Yes

Any med S/E: Denies 

Mental Status Exam

Appearance: Clean, short, buzzed hair, wearing green unit scrubs and cardigan. 

Eye contact: Good 

Behavior: Cooperative, isolates to room, watches TV at times. 

Speech: Soft, minimal, normal rate/rhythm. 

Mood: Euthymic with some anxiety. 

Affect: Congruent with mood

Thought process: Linear

Thought Content:  Future oriented about discharge to [facility]. 

Cognition: A&O X4

Insight: Fair 

Judgment: Fair

Interventions

PRN's used: None

Therapeutic interventions: Maintained a safe and therapeutic environment, provided clear and 
simple instructions, encouraged ADLs and provided positive encouragement, provided 
medication education, monitored/encouraged meal/fluid intake: Q 15 min safety checks.

Restraints/seclusion/emergency medication: N/A

Justification of Continued Inpatient Treatment: Patients psychosis is improving and a safe 
discharge plan is being developed. Patient will continue with therapeutic milieu. Pt. to be 
discharged to [facility] tomorrow

## 2020-09-21 NOTE — NUR
Nursing Progress Note:

Legal hold: 5270 Expires 10/3.

Client on involuntary status for GD

Report received from nurse with use of SBAR: Alesia Churchill RN

Why are they here:  This is a 65-year-old female who was brought to the Emergency Room via 
EMS. She was found in a pile of dirt and leaves on the ground. The patient is somewhat 
confused and having auditory hallucinations. The patient has a history of schizophrenia, 
homelessness, and was weak and exhausted as reported by the ER nursing staff. Patient was 
then admitted to the Neuro floor where she was treated for heat exhaustion, metabolic 
encephalopathy, hypokalemia, and head lice. A 5150 was placed for GD. On admission, patient 
is delusional, she explains to this writer that she has been excluded from the "King[place] 
[place]." Patient states she hears voices but will not elaborate on what they are saying. 

Assessment: 

What happened this shift:   

 Pt was in her room at shift change  in bed with blankets pulled up for a while the got up 
and walked the [address] way. Pt returned to her room and ate snack and was med compliant. Pt 
states that she is looking forword to d/c and the [facility].

S/I, H/I: Denies 

A/VH: Denies

Sleep: Napped x1 in the AM and afternoon. 

ADL's: Independent

Group attendance: No 

Were meds taken: Yes

Any med S/E: Denies 

Mental Status Exam

Appearance: Clean, short, buzzed hair, wearing green unit scrubs and cardigan. 

Eye contact: Good 

Behavior: Cooperative, isolates to room, watches TV at times. 

Speech: Soft, minimal, normal rate/rhythm. 

Mood: Euthymic with some anxiety. 

Affect: Congruent with mood

Thought process: Linear

Thought Content:  Future oriented about discharge to [facility]. 

Cognition: A&O X4

Insight: Fair 

Judgment: Fair

Interventions

PRN's used: None

Therapeutic interventions: Maintained a safe and therapeutic environment, provided clear and 
simple instructions, encouraged ADLs and provided positive encouragement, provided 
medication education, monitored/encouraged meal/fluid intake: Q 15 min safety checks.

Restraints/seclusion/emergency medication: N/A

Justification of Continued Inpatient Treatment: Patients psychosis is improving and a safe 
discharge plan is being developed. Patient will continue with therapeutic milieu. Pt. to be 
discharged to [facility] tomorrow

## 2020-09-21 NOTE — NUR
Nursing Progress Note:

Legal hold: 5270 Expires 10/3.

Client on involuntary status for GD

Report received from nurse with use of SBAR: Alesia Churchill RN

Why are they here:  This is a 65-year-old female who was brought to the Emergency Room via 
EMS. She was found in a pile of dirt and leaves on the ground. The patient is somewhat 
confused and having auditory hallucinations. The patient has a history of schizophrenia, 
homelessness, and was weak and exhausted as reported by the ER nursing staff. Patient was 
then admitted to the Neuro floor where she was treated for heat exhaustion, metabolic 
encephalopathy, hypokalemia, and head lice. A 5150 was placed for GD. On admission, patient 
is delusional, she explains to this writer that she has been excluded from the "[place] 
[place]." Patient states she hears voices but will not elaborate on what they are saying. 

Assessment:  

What happened this shift: Patient stayed in her room this shift was med compliant and 
pleasant. She stated that she is looking foreword to going the [facility] tomorrow. I feel good 
just resting for the move.  

S/I, H/I: Denies 

A/VH: Denies

Sleep: Napped x1 on day shift. 

ADL's: Independent

Group attendance: No 

Were meds taken: Yes

Any med S/E: Denies 

Mental Status Exam

Appearance: Clean, short, buzzed hair, wearing green unit scrubs and cardigan. 

Eye contact: Good 

Behavior: Cooperative, isolates to room.

Speech: Soft, minimal, normal rate/rhythm. 

Mood: Euthymic 

Affect: Congruent with mood

Thought process: Linear

Thought Content:  Future oriented about discharge on Monday. 

Cognition: A&O X4

Insight: Fair 

Judgment: Fair

Interventions

PRN's used: None

Therapeutic interventions: Maintained a safe and therapeutic environment, provided clear and 
simple instructions, encouraged ADLs and provided positive encouragement, provided 
medication education, monitored/encouraged meal/fluid intake: Q 15 min safety checks.

Restraints/seclusion/emergency medication: N/A

Justification of Continued Inpatient Treatment: Patients psychosis is improving and a safe 
discharge plan is being developed. Patient will continue with therapeutic milieu. Pt. to be 
discharged to [facility] tomorrow 9/21

## 2020-09-21 NOTE — NUR
DISCHARGE PLANNING



Attempted to  Breanne's medications at the pharmacy to assist with discharge and 
transition to [facility]. [facility] [address] staff reported a TAR is needed for the Invega. 
It would have cost $48 to  a 2 day supply. Informed EBONY Steel, that the pharmacy 
requested documentation stating what medications have been tried and failed along with 
diagnosis and ICD 10 code. Staff can fax the notes to the pharmacy. Informed Breanne of the 
delay in discharge and also called [facility] staff to apprise them of the situation.



[facility] phone[facility] [phone]

Fax# [phone]



CASSANDRA Aguilar

## 2020-09-22 VITALS — SYSTOLIC BLOOD PRESSURE: 142 MMHG | DIASTOLIC BLOOD PRESSURE: 86 MMHG

## 2020-09-22 VITALS — DIASTOLIC BLOOD PRESSURE: 88 MMHG | SYSTOLIC BLOOD PRESSURE: 128 MMHG

## 2020-09-22 RX ADMIN — PALIPERIDONE SCH MG: 3 TABLET, EXTENDED RELEASE ORAL at 07:47

## 2020-09-22 RX ADMIN — Medication SCH CAN: at 08:08

## 2020-09-22 RX ADMIN — VENLAFAXINE HYDROCHLORIDE SCH MG: 75 CAPSULE, EXTENDED RELEASE ORAL at 07:47

## 2020-09-22 RX ADMIN — PALIPERIDONE SCH MG: 3 TABLET, EXTENDED RELEASE ORAL at 20:17

## 2020-09-22 RX ADMIN — Medication SCH CAN: at 18:31

## 2020-09-22 RX ADMIN — Medication SCH CAN: at 13:29

## 2020-09-22 NOTE — NUR
CM-dc



Presenting Issues: 

Pt's been accepted @ [facility], dc is delayed due TAR issues for her meds. 



Interventions: 

SS had t/c w/[facility] Pharmacist [phone] and provided ICD-10 code, per consultation, the 
pharmacy will apply for a TAR and it will take 24 hrs before pt's meds will be ready for 
pick-up.  



SS had t/c with [facility] and informed them that d/c is delayed due to TAR issues. 



Plan: 

SS will f/u with pharmacy later this afternoon, pt to d/c tomorrow. 

Katalina Zacarias, DELFINA

## 2020-09-22 NOTE — NUR
F/u (9/22): Pt % meals and ensure high protein TIDWM meeting needs. LBM 9/20. No 
nutrition concerns at this time. Will continue to monitor.

Recommendations:

1) Continue regular diet

2) Encourage PO intake

3) Ensure High Protein TIDWM

4) Bowel care PRN

5) Scaled weights per rx

-------------------------------------------------------------------------------

Addendum: 09/22/20 at 1541 by Tu [address]

-------------------------------------------------------------------------------

Amended: Links added.

## 2020-09-22 NOTE — NUR
CM-d/c



Presenting Issues: 

TAR is needed for part of pt's d/c meds. 



Interventions: 

SS had t/c w/[facility] Pharmacist- [phone] and provided ICD-10 code for TAR application. Per t/c 
[facility] will now apply for the TAR and the process will take 24hrs.  Pharmacist suggest that SS 
call back late this afternoon to check on the TAR process and availability of meds for pick 
up. 



SS had t/c with [facility] and informed them of delayed d/c. 



Plan: 

SS will monitor TAR/meds availability and  pt's meds when available. Pt to d/c 
tomorrow. 

Katalina Zacarias LCSKAITLYNN

-------------------------------------------------------------------------------

Addendum: 09/22/20 at 0947 by Katalina Zacarias 

-------------------------------------------------------------------------------

Amended: Links added.

## 2020-09-22 NOTE — NUR
DISCHARGE PLANNING



Picked up Breanne's medications at [facility] to assist with discharge and transition to 
[facility]. [facility] and was ready this afternoon. Plan is for her to discharge 
tomorrow to [facility].



CASSANDRA Aguilar

## 2020-09-22 NOTE — NUR
Nursing Progress Note:

Legal hold: 5270 Expires 10/3.

Client on involuntary status for GD

Report received from nurse with use of SBAR: Alesia Churchill RN

Why are they here:  This is a 65-year-old female who was brought to the Emergency Room via 
EMS. She was found in a pile of dirt and leaves on the ground. The patient is somewhat 
confused and having auditory hallucinations. The patient has a history of schizophrenia, 
homelessness, and was weak and exhausted as reported by the ER nursing staff. Patient was 
then admitted to the Neuro floor where she was treated for heat exhaustion, metabolic 
encephalopathy, hypokalemia, and head lice. A 5150 was placed for GD. On admission, patient 
is delusional, she explains to this writer that she has been excluded from the "[place] [facility] 
[place]." Patient states she hears voices but will not elaborate on what they are saying. 

Assessment: 

What happened this shift:   

 Pt. is asleep at start of shift. Pt. awake for breakfast. Pt. took all medications and ate 
all meals in the community room. 1:1 done at bedside. Pt. reports she is ready to be 
discharged and hopes to be discharged to the [facility] today, but is ok if it is not until 
tomorrow. Pt. denies SI/HI, A/V hallucinations. Pt. declined going to AM group but says she 
will go to afternoon group because it is art group. RN informed that pt. will be discharged 
tomorrow 9/22 (around noon) Pt.s medications delivered and placed in [facility]. 

S/I, H/I: Denies 

A/VH: Denies

Sleep: Pt. did not nap today 

ADL's: Independent

Group attendance: No 

Were meds taken: Yes

Any med S/E: Denies 

Mental Status Exam

Appearance: Clean, short, buzzed hair, wearing green unit scrubs and cardigan. 

Eye contact: Good 

Behavior: Cooperative, isolates to room, watches TV at times. 

Speech: Soft, minimal, normal rate/rhythm. 

Mood: Euthymic 

Affect: Congruent with mood

Thought process: Linear

Thought Content:  Future oriented about discharge to [facility]. 

Cognition: A&O X4

Insight: Fair 

Judgment: Fair

Interventions

PRN's used: None

Therapeutic interventions: Maintained a safe and therapeutic environment, provided clear and 
simple instructions, encouraged ADLs and provided positive encouragement, provided 
medication education, monitored/encouraged meal/fluid intake: Q 15 min safety checks.

Restraints/seclusion/emergency medication: N/A

Justification of Continued Inpatient Treatment: Patients psychosis is improving and a safe 
discharge plan is being developed. Patient will continue with therapeutic milieu. Pt. to be 
discharged to [facility] tomorrow

## 2020-09-23 VITALS — SYSTOLIC BLOOD PRESSURE: 133 MMHG | DIASTOLIC BLOOD PRESSURE: 85 MMHG

## 2020-09-23 RX ADMIN — Medication SCH CAN: at 08:18

## 2020-09-23 RX ADMIN — Medication SCH CAN: at 10:04

## 2020-09-23 RX ADMIN — VENLAFAXINE HYDROCHLORIDE SCH MG: 75 CAPSULE, EXTENDED RELEASE ORAL at 08:17

## 2020-09-23 RX ADMIN — PALIPERIDONE SCH MG: 3 TABLET, EXTENDED RELEASE ORAL at 08:18

## 2020-09-23 NOTE — NUR
Pt. discharged to [facility] driven by [facility] . Pt. A&Ox4. Pt. is calm and cooperative and 
shows no signs of distress. Pt. denies SI/HI, A/V hallucinations. RN Discussed medications 
and discharge plan and pt. verbalized understanding. Pt. showed considerable improvement in 
her mood during hospital stay. Pt. discharged with all her belongings. Pt. ambulated off 
unit accompanied by unit staff.

## 2020-09-24 NOTE — NUR
Pt was d/c yesterday and transitioned to [facility] for additional support. SS referral 
closed. 

Katalina Zacarias LCS

-------------------------------------------------------------------------------

Addendum: 09/24/20 at 0832 by Katalina Zacarias SS

-------------------------------------------------------------------------------

Amended: Links added.

## 2020-12-06 ENCOUNTER — HOSPITAL ENCOUNTER (EMERGENCY)
Dept: HOSPITAL 94 - ER | Age: 65
Discharge: HOME | End: 2020-12-06
Payer: MEDICAID

## 2020-12-06 VITALS — HEIGHT: 65 IN | WEIGHT: 150.31 LBS | BODY MASS INDEX: 25.04 KG/M2

## 2020-12-06 DIAGNOSIS — I50.9: ICD-10-CM

## 2020-12-06 DIAGNOSIS — Z56.0: ICD-10-CM

## 2020-12-06 DIAGNOSIS — Z20.828: ICD-10-CM

## 2020-12-06 DIAGNOSIS — Z79.899: ICD-10-CM

## 2020-12-06 DIAGNOSIS — R05: Primary | ICD-10-CM

## 2020-12-06 DIAGNOSIS — I25.10: ICD-10-CM

## 2020-12-06 PROCEDURE — 87635 SARS-COV-2 COVID-19 AMP PRB: CPT

## 2020-12-06 PROCEDURE — 36415 COLL VENOUS BLD VENIPUNCTURE: CPT

## 2020-12-06 PROCEDURE — 99283 EMERGENCY DEPT VISIT LOW MDM: CPT

## 2020-12-06 SDOH — ECONOMIC STABILITY - INCOME SECURITY: UNEMPLOYMENT, UNSPECIFIED: Z56.0

## 2021-01-26 ENCOUNTER — HOSPITAL ENCOUNTER (EMERGENCY)
Dept: HOSPITAL 94 - ER | Age: 66
LOS: 1 days | Discharge: HOME | End: 2021-01-27
Payer: MEDICAID

## 2021-01-26 VITALS — HEIGHT: 65 IN | WEIGHT: 150.31 LBS | BODY MASS INDEX: 25.04 KG/M2

## 2021-01-26 DIAGNOSIS — F41.9: Primary | ICD-10-CM

## 2021-01-26 DIAGNOSIS — F32.9: ICD-10-CM

## 2021-01-26 DIAGNOSIS — I50.9: ICD-10-CM

## 2021-01-26 DIAGNOSIS — I11.0: ICD-10-CM

## 2021-01-26 DIAGNOSIS — Z59.0: ICD-10-CM

## 2021-01-26 DIAGNOSIS — I25.10: ICD-10-CM

## 2021-01-26 DIAGNOSIS — Z79.899: ICD-10-CM

## 2021-01-26 PROCEDURE — 99285 EMERGENCY DEPT VISIT HI MDM: CPT

## 2021-01-26 SDOH — ECONOMIC STABILITY - HOUSING INSECURITY: HOMELESSNESS: Z59.0

## 2021-01-26 NOTE — NUR
ORDERED MEDS TAKEN IN TO PATIENT.  PATIENT STATES "NO, THAT'S OKAY, I JUST WANT 
TO GO",  EBONY LARRY AWARE.  ARRANGEMENTS BEING MADE FOR TAXI RIDE HOME; 
CURRENTLY SNOWING OUTSIDE.  PATIENT ESCORTED BACK TO HER ROOM AFTER SHE HEADED 
FOR LOBBY; WAS INSTRUCTED TO WAIT FOR DISCHARGE PAPERWORK AND RIDE DISPOSITION. 
 RESTING COMFORTABLY.

## 2021-01-26 NOTE — NUR
Left message for caregiver listed:  Halle.  Patient in room, given blanket 
and updated on our efforts to find her transportation safely home.

## 2021-01-26 NOTE — NUR
WENT TO ADMINISTER IM ATIVAN HOWEVER PATIENT STATES "I WOULD RATHER NOT HAVE A 
BENZODIAZEPINE".  EBONY LARRY INFORMED.

## 2021-01-27 ENCOUNTER — HOSPITAL ENCOUNTER (EMERGENCY)
Dept: HOSPITAL 94 - ER | Age: 66
Discharge: HOME | End: 2021-01-27
Payer: MEDICAID

## 2021-01-27 VITALS — DIASTOLIC BLOOD PRESSURE: 93 MMHG | SYSTOLIC BLOOD PRESSURE: 139 MMHG

## 2021-01-27 VITALS — DIASTOLIC BLOOD PRESSURE: 83 MMHG | SYSTOLIC BLOOD PRESSURE: 158 MMHG

## 2021-01-27 VITALS — BODY MASS INDEX: 27.79 KG/M2 | HEIGHT: 65 IN | WEIGHT: 166.78 LBS

## 2021-01-27 DIAGNOSIS — I50.9: ICD-10-CM

## 2021-01-27 DIAGNOSIS — Z79.899: ICD-10-CM

## 2021-01-27 DIAGNOSIS — Z00.00: Primary | ICD-10-CM

## 2021-01-27 DIAGNOSIS — I25.10: ICD-10-CM

## 2021-01-27 DIAGNOSIS — Z56.0: ICD-10-CM

## 2021-01-27 PROCEDURE — 99281 EMR DPT VST MAYX REQ PHY/QHP: CPT

## 2021-01-27 SDOH — ECONOMIC STABILITY - INCOME SECURITY: UNEMPLOYMENT, UNSPECIFIED: Z56.0

## 2021-01-27 NOTE — NUR
Pt sleeping on bed, no distress noted. Pt continues to await transport. 
Attempting to contact her residence to assist with finding transport. No answer 
at that location or when attempting to contact caregiver. Continuing to 
monitior and find transportation.

## 2021-01-27 NOTE — NUR
Patient moved to bed #27 in ED overflow due to lack of transportation at this 
time.  Patient is amenable to the current plan to stay here until 
transportation home can be safely arranged; currently no taxi services 
available due to steady snowfall; unable to reach caregiver, message left on 
her voicemail but call has not been returned.  Report given to Fredy PRATHER.

## 2021-01-27 NOTE — NUR
PT WAS HERE LASTS NIGHT AND THERE IS A NUMBER IN HER CHART TO CALL TO HAVE 
SOMEONE PICK HER UP -[phone] THERE WAS NO ANSWER AND THE MAIL BOX WAS FULL. 
 WAS ALSO CALLED BUT NO ANSWER. WILL CONTINUE TO FIND A RIDE FOR 
PATIENT

## 2021-01-27 NOTE — NUR
Taxi service reported they will not be transporting anyone until the roads are 
clear this evening. Another failed attempt to contact the caregiver, Halle, 
was also made. Pt denied any other available contact resources. Pt is currently 
sitting on edge of bed quietly, denying any requests at this time.

## 2021-01-27 NOTE — NUR
Pt sitting up in bed eating breakfast. Pt had to be encouraged to eat. Pt 
observed to have odd behavior occasionally. When nurse approached pt to ask pt 
how she was doing or if she needed anything, pt anxiously asked nurse, "can you 
go away right now. I'm busy." Pt then continued to look concerned while staring 
distantly at wall.

## 2021-01-27 NOTE — NUR
Reji Vargas, Owner of [facility] returned call and will assist with getting 
patient a ride home.

## 2021-01-28 ENCOUNTER — HOSPITAL ENCOUNTER (EMERGENCY)
Dept: HOSPITAL 94 - ER | Age: 66
LOS: 1 days | Discharge: PSYCHIATRIC OR SUBSTANCE USE FACILITY | End: 2021-01-29
Payer: MEDICAID

## 2021-01-28 VITALS — WEIGHT: 150.31 LBS | BODY MASS INDEX: 25.04 KG/M2 | HEIGHT: 65 IN

## 2021-01-28 DIAGNOSIS — F32.9: ICD-10-CM

## 2021-01-28 DIAGNOSIS — Z20.9: ICD-10-CM

## 2021-01-28 DIAGNOSIS — I50.9: ICD-10-CM

## 2021-01-28 DIAGNOSIS — N18.9: ICD-10-CM

## 2021-01-28 DIAGNOSIS — I25.2: ICD-10-CM

## 2021-01-28 DIAGNOSIS — F79: Primary | ICD-10-CM

## 2021-01-28 DIAGNOSIS — I25.10: ICD-10-CM

## 2021-01-28 DIAGNOSIS — Z20.822: ICD-10-CM

## 2021-01-28 DIAGNOSIS — Z56.0: ICD-10-CM

## 2021-01-28 LAB
ALBUMIN SERPL BCP-MCNC: 4.4 G/DL (ref 3.4–5)
ALBUMIN/GLOB SERPL: 1 {RATIO} (ref 1.1–1.5)
ALP SERPL-CCNC: 73 IU/L (ref 46–116)
ALT SERPL W P-5'-P-CCNC: 23 U/L (ref 12–78)
AMORPH URATE CRY #/AREA URNS HPF: (no result) /[HPF]
AMPHETAMINES UR QL SCN: NEGATIVE
ANION GAP SERPL CALCULATED.3IONS-SCNC: 10 MMOL/L (ref 8–16)
AST SERPL W P-5'-P-CCNC: 20 U/L (ref 10–37)
BACTERIA URNS QL MICRO: (no result) /HPF
BARBITURATES UR QL SCN: NEGATIVE
BASOPHILS # BLD AUTO: 0.1 X10'3 (ref 0–0.2)
BASOPHILS NFR BLD AUTO: 0.4 % (ref 0–1)
BENZODIAZ UR QL SCN: NEGATIVE
BILIRUB SERPL-MCNC: 0.5 MG/DL (ref 0.1–1)
BUN SERPL-MCNC: 32 MG/DL (ref 7–18)
BUN/CREAT SERPL: 24.1 (ref 6.6–38)
BZE UR QL SCN: NEGATIVE
CALCIUM SERPL-MCNC: 10.2 MG/DL (ref 8.5–10.1)
CANNABINOIDS UR QL SCN: NEGATIVE
CHLORIDE SERPL-SCNC: 105 MMOL/L (ref 99–107)
CLARITY UR: (no result)
CO2 SERPL-SCNC: 28.6 MMOL/L (ref 24–32)
COLOR UR: YELLOW
CREAT SERPL-MCNC: 1.33 MG/DL (ref 0.4–0.9)
DEPRECATED SQUAMOUS URNS QL MICRO: (no result) /LPF
EOSINOPHIL # BLD AUTO: 0 X10'3 (ref 0–0.9)
EOSINOPHIL NFR BLD AUTO: 0.3 % (ref 0–6)
ERYTHROCYTE [DISTWIDTH] IN BLOOD BY AUTOMATED COUNT: 14.6 % (ref 11.5–14.5)
ETHANOL SERPL-MCNC: < 0.01 GM/DL (ref 0–0.01)
GFR SERPL CREATININE-BSD FRML MDRD: 40 ML/MIN
GLUCOSE SERPL-MCNC: 100 MG/DL (ref 70–104)
GLUCOSE UR STRIP-MCNC: NEGATIVE MG/DL
HCT VFR BLD AUTO: 42.7 % (ref 35–45)
HGB BLD-MCNC: 14 G/DL (ref 12–16)
HGB UR QL STRIP: NEGATIVE
HYALINE CASTS URNS QL MICRO: (no result) /LPF
KETONES UR STRIP-MCNC: 15 MG/DL
LEUKOCYTE ESTERASE UR QL STRIP: NEGATIVE
LYMPHOCYTES # BLD AUTO: 2.7 X10'3 (ref 1.1–4.8)
LYMPHOCYTES NFR BLD AUTO: 18 % (ref 21–51)
MCH RBC QN AUTO: 27.3 PG (ref 27–31)
MCHC RBC AUTO-ENTMCNC: 32.9 G/DL (ref 33–36.5)
MCV RBC AUTO: 83.1 FL (ref 78–98)
METHADONE UR QL SCN: NEGATIVE
MONOCYTES # BLD AUTO: 1.1 X10'3 (ref 0–0.9)
MONOCYTES NFR BLD AUTO: 7.2 % (ref 2–12)
MUCOUS THREADS URNS QL MICRO: (no result) /LPF
NEUTROPHILS # BLD AUTO: 11 X10'3 (ref 1.8–7.7)
NEUTROPHILS NFR BLD AUTO: 74.1 % (ref 42–75)
NITRITE UR QL STRIP: NEGATIVE
OPIATES UR QL SCN: NEGATIVE
PCP UR QL SCN: NEGATIVE
PH UR STRIP: 5.5 [PH] (ref 4.8–8)
PLATELET # BLD AUTO: 248 X10'3 (ref 140–440)
PMV BLD AUTO: 10.7 FL (ref 7.4–10.4)
POTASSIUM SERPL-SCNC: 3.7 MMOL/L (ref 3.5–5.1)
PROT SERPL-MCNC: 8.9 G/DL (ref 6.4–8.2)
PROT UR QL STRIP: 30 MG/DL
RBC # BLD AUTO: 5.14 X10'6 (ref 4.2–5.6)
RBC #/AREA URNS HPF: (no result) /HPF (ref 0–2)
SODIUM SERPL-SCNC: 144 MMOL/L (ref 135–145)
SP GR UR STRIP: >=1.03 (ref 1–1.03)
URN COLLECT METHOD CLASS: (no result)
UROBILINOGEN UR STRIP-MCNC: 0.2 E.U/DL (ref 0.2–1)
WBC # BLD AUTO: 14.8 X10'3 (ref 4.5–11)
WBC #/AREA URNS HPF: (no result) /HPF (ref 0–4)

## 2021-01-28 PROCEDURE — 81001 URINALYSIS AUTO W/SCOPE: CPT

## 2021-01-28 PROCEDURE — 80320 DRUG SCREEN QUANTALCOHOLS: CPT

## 2021-01-28 PROCEDURE — 84443 ASSAY THYROID STIM HORMONE: CPT

## 2021-01-28 PROCEDURE — 99285 EMERGENCY DEPT VISIT HI MDM: CPT

## 2021-01-28 PROCEDURE — 85025 COMPLETE CBC W/AUTO DIFF WBC: CPT

## 2021-01-28 PROCEDURE — 80305 DRUG TEST PRSMV DIR OPT OBS: CPT

## 2021-01-28 PROCEDURE — 87426 SARSCOV CORONAVIRUS AG IA: CPT

## 2021-01-28 PROCEDURE — 80053 COMPREHEN METABOLIC PANEL: CPT

## 2021-01-28 PROCEDURE — 36415 COLL VENOUS BLD VENIPUNCTURE: CPT

## 2021-01-28 SDOH — ECONOMIC STABILITY - INCOME SECURITY: UNEMPLOYMENT, UNSPECIFIED: Z56.0

## 2021-01-28 NOTE — NUR
[facility] AT BEDSIDE TO ELLEN PT. STATED UNABLE TO CONTACT HER CAREHOME AT THIS TIME 
AND WILL UPDATE ON POC ONCE DECIDED.

## 2021-01-28 NOTE — NUR
PATIENT WAS ESCORTED TO RESTROOM BY RN.  PATIENT CAME OUT OF RESTROOM HEADED 
FOR EXIT STATING "I HAVE TO GET TO MY CAR, I WILL BE RIGHT BACK".  PATIENT 
ESCORTED BACK TO HER ROOM BY STAFF MEMBERS.  PATIENT IS COOPERATIVE WITH STAFF.

## 2021-01-28 NOTE — NUR
EXPLAINED TO PATIENT SHE WILL BE ON A MENTAL HEALTH HOLD AND NEED OF A US AND 
LABS DRAWN. PT WALKED TO BATHROOM WITH ASSISTANCE BUT UNABLE TO GIVE UA. LABS 
DRAWN AND WHEN WALKED OUT OF ROOM THE PATIENT RAN OUT OF THE ED. SECURITY 
CALLED BUT PT WAS SECURED AND BROUGHT BACK TO ED WITHOUT INCIDENET. PT CHANGED 
INTO [facility] AND ALL BELONGINGS TAKEN FROM PATIENT.

## 2021-01-29 VITALS — DIASTOLIC BLOOD PRESSURE: 84 MMHG | SYSTOLIC BLOOD PRESSURE: 138 MMHG

## 2021-01-29 NOTE — NUR
patient continues to sleep peacefully on her back at this time, attempted to 
wake her up for her breakfast she acknowledged it was there and then went back 
to sleep.  The patient's respirations appeared normal and she was not in any 
distress at this time.

## 2021-01-29 NOTE — NUR
Patient is compliant with night time medications. When this writer touched 
patients left shoulder while talking to her the patient flinched. She stated 
"please don't do that." This writer apologized and explaind, "I was just trying 
to be kind."  Patient accepted crackers and ice water. She had not eaten 
dinner.

## 2021-01-29 NOTE — NUR
patient is sleeping on her left side, her respirations appear normal and she is 
not in any distress at this time.

## 2021-01-29 NOTE — NUR
Attempted to wake up the patient again to see if she would eat her breakfast 
and she again declined, at this time I did give her one scheduled medication 
and she complied and took the medication.  The patient then laid back down on 
her right side and went back to sleep.  The patient's respirations appeared 
normal and she was not in any distress at this time.

## 2021-01-29 NOTE — NUR
the patient is currently sleeping on her left side, her respirations appear 
normal and she is not in any distress at this time.

## 2021-01-29 NOTE — NUR
patient ambulated to the bathroom in stable condition and then returned to her 
bed and laid down the patient's respirations appeared normal and no distress 
noted at this time.

## 2021-01-29 NOTE — NUR
breaking Primary RN, pt is laying on her right side, sleeping, aroused 5 mins 
ago for meds, no needs at this time

## 2021-01-29 NOTE — NUR
patient laying on her right side sleeping, her respirations appear normal and 
she is not in any distress at this time.

## 2021-01-29 NOTE — NUR
the patient is sitting up on the side of the bed her respirations appear normal 
and she is not in any distress at this time.

## 2021-01-29 NOTE — NUR
patient sitting up on the side of bed, no needs at this time.  The patient's 
respirations appear normal and there are no signs of distress.

## 2021-01-29 NOTE — NUR
received report on patient from night shift the patient was laying on her left 
side sleeping, her respirations appeared normal and she was not in any 
distress.  The patient did wake up and ambulate to the bathroom shortly after 
receiving report.  The patient's ambulation was steady.

## 2021-01-29 NOTE — NUR
patient is once again sleeping laying on her left side her respirations appear 
normal and she is not in any distress at this time.  The patient has no needs 
at this time.

## 2021-01-29 NOTE — NUR
the patient was asleep but was awaken by the tech to do a set of vital signs. 
The patient's respirations appeared normal and there were no signs of distress. 
 The patient laid back down on the right side and went back to sleep.

## 2021-01-29 NOTE — NUR
Patient is discharged from ED. She is now being admitted to Behavioral Health. 
Patient is transported with security and a behavioral health tech to behavioral health. Patient is awake, oriented to person and place. Pressured speech. Some 
anxiety noted.

## 2022-05-17 NOTE — NUR
Nursing Progress Note:



Legal hold: 5250



Client on involuntary status for GD



Report received from nurse with use of SBAR: YAMILKA Nevarez



Why are they here: This is a 65-year-old female who was brought to the Emergency Room via 
EMS. She was found in a pile of dirt and leaves on the ground. The patient is somewhat 
confused and having auditory hallucinations. The patient has a history of schizophrenia, 
homelessness, and was weak and exhausted as reported by the ER nursing staff. Patient was 
then admitted to the Neuro floor where she was treated for heat exhaustion, metabolic 
encephalopathy, hypokalemia, and head lice. A 5150 was placed for GD. On admission, patient 
is delusional, she explains to this writer that she has been excluded from the "[facility]." Patient states she hears voices but will not elaborate on what they are saying. 



Assessment



What has happened this shift: Pt was laying in bed curled up in fetal position at change of 
shift and remained that way isolating to her bed throughout the shift. Pt refused vitals but 
did sit up and take her oral meds. Pt also finished 100% of ensure. 

S/I, H/I: Unable to assess

A/VH: Continues to appear internally preoccupied AEB distractibility and significant delay 
in responses

Sleep: see sleep hours 

ADL's: Requires encouragement

Group attendance: N/A

Were meds taken: Yes

Any med S/E: N/A





Mental Status Exam



Appearance: Somewhat disheveled, however appropriately dressed

Eye contact: Poor, avoidant, stares blankly

Behavior: Resistive to care, anxious, guarded, isolative, and paranoid

Speech: Pt. mumbles softly to herself, speech is soft

Mood: Guarded and paranoid

Affect: Flat

Thought process: Poverty of thought with possible thought blocking

Thought Content: Paranoid delusions and internal preoccupation

Cognition: Unable to assess

Insight: Poor

Judgment: Poor







Interventions



PRN's used: None

Therapeutic interventions: Maintained a safe and therapeutic environment, provided clear and 
simple instructions, attempted to orient to reality, encouraged ADLs and provided positive 
encouragement, provided medication education and monitored/encouraged meal/fluid intake, 
educated regarding Riese, and maintained Q 15min safety checks.

Restraints/seclusion/emergency medication: N/A





Justification of Continued Inpatient Treatment: Per EBONY Ramos, remains gravely disabled, 
and requires medication adjustments and a safe and supportive environment. Pt. is now 
Riesed. Isotretinoin Counseling: Patient should get monthly blood tests, not donate blood, not drive at night if vision affected, not share medication, and not undergo elective surgery for 6 months after tx completed. Side effects reviewed, pt to contact office should one occur.

## 2022-07-28 ENCOUNTER — HOSPITAL ENCOUNTER (INPATIENT)
Dept: HOSPITAL 94 - ER | Age: 67
LOS: 19 days | Discharge: HOME | DRG: 885 | End: 2022-08-16
Attending: PSYCHIATRY & NEUROLOGY | Admitting: PSYCHIATRY & NEUROLOGY
Payer: MEDICARE

## 2022-07-28 VITALS — BODY MASS INDEX: 25.04 KG/M2 | HEIGHT: 65 IN | WEIGHT: 150.31 LBS

## 2022-07-28 DIAGNOSIS — I25.10: ICD-10-CM

## 2022-07-28 DIAGNOSIS — F32.A: ICD-10-CM

## 2022-07-28 DIAGNOSIS — I50.9: ICD-10-CM

## 2022-07-28 DIAGNOSIS — F25.1: Primary | ICD-10-CM

## 2022-07-28 DIAGNOSIS — E11.22: ICD-10-CM

## 2022-07-28 DIAGNOSIS — I25.2: ICD-10-CM

## 2022-07-28 DIAGNOSIS — E78.5: ICD-10-CM

## 2022-07-28 DIAGNOSIS — Z56.0: ICD-10-CM

## 2022-07-28 DIAGNOSIS — Z91.19: ICD-10-CM

## 2022-07-28 DIAGNOSIS — Z20.822: ICD-10-CM

## 2022-07-28 DIAGNOSIS — I13.0: ICD-10-CM

## 2022-07-28 DIAGNOSIS — F23: ICD-10-CM

## 2022-07-28 DIAGNOSIS — R45.851: ICD-10-CM

## 2022-07-28 DIAGNOSIS — N18.9: ICD-10-CM

## 2022-07-28 LAB
ALBUMIN SERPL BCP-MCNC: 3.7 G/DL (ref 3.4–5)
ALBUMIN/GLOB SERPL: 0.9 {RATIO} (ref 1.1–1.5)
ALP SERPL-CCNC: 84 IU/L (ref 46–116)
ALT SERPL W P-5'-P-CCNC: 32 U/L (ref 12–78)
AMPHETAMINES UR QL SCN: NEGATIVE
ANION GAP SERPL CALCULATED.3IONS-SCNC: 11 MMOL/L (ref 8–16)
AST SERPL W P-5'-P-CCNC: 19 U/L (ref 10–37)
BARBITURATES UR QL SCN: NEGATIVE
BASOPHILS # BLD AUTO: 0.1 X10'3 (ref 0–0.2)
BASOPHILS NFR BLD AUTO: 0.6 % (ref 0–1)
BENZODIAZ UR QL SCN: NEGATIVE
BILIRUB SERPL-MCNC: 0.2 MG/DL (ref 0.1–1)
BUN SERPL-MCNC: 32 MG/DL (ref 7–18)
BUN/CREAT SERPL: 24.6 (ref 6.6–38)
BZE UR QL SCN: NEGATIVE
CALCIUM SERPL-MCNC: 9.7 MG/DL (ref 8.5–10.1)
CANNABINOIDS UR QL SCN: NEGATIVE
CHLORIDE SERPL-SCNC: 106 MMOL/L (ref 99–107)
CLARITY UR: CLEAR
CO2 SERPL-SCNC: 25.3 MMOL/L (ref 24–32)
COLOR UR: YELLOW
CREAT SERPL-MCNC: 1.3 MG/DL (ref 0.4–0.9)
EOSINOPHIL # BLD AUTO: 0.2 X10'3 (ref 0–0.9)
EOSINOPHIL NFR BLD AUTO: 2 % (ref 0–6)
ERYTHROCYTE [DISTWIDTH] IN BLOOD BY AUTOMATED COUNT: 16.3 % (ref 11.5–14.5)
ETHANOL SERPL-MCNC: < 0.01 GM/DL (ref 0–0.01)
GFR SERPL CREATININE-BSD FRML MDRD: 41 ML/MIN
GLUCOSE SERPL-MCNC: 111 MG/DL (ref 70–104)
GLUCOSE UR STRIP-MCNC: NEGATIVE MG/DL
HCG UR QL: NEGATIVE
HCT VFR BLD AUTO: 39.9 % (ref 35–45)
HGB BLD-MCNC: 13.3 G/DL (ref 12–16)
HGB UR QL STRIP: NEGATIVE
KETONES UR STRIP-MCNC: (no result) MG/DL
LEUKOCYTE ESTERASE UR QL STRIP: NEGATIVE
LYMPHOCYTES # BLD AUTO: 3.1 X10'3 (ref 1.1–4.8)
LYMPHOCYTES NFR BLD AUTO: 27.9 % (ref 21–51)
MCH RBC QN AUTO: 26.4 PG (ref 27–31)
MCHC RBC AUTO-ENTMCNC: 33.4 G/DL (ref 33–36.5)
MCV RBC AUTO: 79.1 FL (ref 78–98)
METHADONE UR QL SCN: NEGATIVE
MONOCYTES # BLD AUTO: 0.9 X10'3 (ref 0–0.9)
MONOCYTES NFR BLD AUTO: 8.2 % (ref 2–12)
NEUTROPHILS # BLD AUTO: 6.7 X10'3 (ref 1.8–7.7)
NEUTROPHILS NFR BLD AUTO: 61.3 % (ref 42–75)
NITRITE UR QL STRIP: NEGATIVE
OPIATES UR QL SCN: NEGATIVE
PCP UR QL SCN: NEGATIVE
PH UR STRIP: 5.5 [PH] (ref 4.8–8)
PLATELET # BLD AUTO: 237 X10'3 (ref 140–440)
PMV BLD AUTO: 10.4 FL (ref 7.4–10.4)
POTASSIUM SERPL-SCNC: 4.5 MMOL/L (ref 3.5–5.1)
PROT SERPL-MCNC: 7.9 G/DL (ref 6.4–8.2)
PROT UR QL STRIP: NEGATIVE MG/DL
RBC # BLD AUTO: 5.05 X10'6 (ref 4.2–5.6)
SODIUM SERPL-SCNC: 142 MMOL/L (ref 135–145)
SP GR UR STRIP: >=1.03 (ref 1–1.03)
URN COLLECT METHOD CLASS: (no result)
UROBILINOGEN UR STRIP-MCNC: 0.2 E.U/DL (ref 0.2–1)
WBC # BLD AUTO: 11 X10'3 (ref 4.5–11)

## 2022-07-28 PROCEDURE — 80053 COMPREHEN METABOLIC PANEL: CPT

## 2022-07-28 PROCEDURE — 81003 URINALYSIS AUTO W/O SCOPE: CPT

## 2022-07-28 PROCEDURE — 80305 DRUG TEST PRSMV DIR OPT OBS: CPT

## 2022-07-28 PROCEDURE — 99285 EMERGENCY DEPT VISIT HI MDM: CPT

## 2022-07-28 PROCEDURE — 80320 DRUG SCREEN QUANTALCOHOLS: CPT

## 2022-07-28 PROCEDURE — 36415 COLL VENOUS BLD VENIPUNCTURE: CPT

## 2022-07-28 PROCEDURE — 87081 CULTURE SCREEN ONLY: CPT

## 2022-07-28 PROCEDURE — 87811 SARS-COV-2 COVID19 W/OPTIC: CPT

## 2022-07-28 PROCEDURE — 84443 ASSAY THYROID STIM HORMONE: CPT

## 2022-07-28 PROCEDURE — 81025 URINE PREGNANCY TEST: CPT

## 2022-07-28 PROCEDURE — 85025 COMPLETE CBC W/AUTO DIFF WBC: CPT

## 2022-07-28 SDOH — ECONOMIC STABILITY - INCOME SECURITY: UNEMPLOYMENT, UNSPECIFIED: Z56.0

## 2022-07-28 NOTE — NUR
One to one with the patient who was very guarded and presented as paranoid 
although she denies any mental health symptoms what so ever. She was oriented. 
She had difficulty verbalizing a plan for food, shelter or clothing. She is 
stated numerous times "I expect to be discharged at the end of my 72 hours" She 
refuses to be restarted on any of her medications and was evasive when asked 
when she last took medications. The patient is refusing an IV or IV fluids but 
does agree to drink fluids and is currently drinking juice and water.

## 2022-07-28 NOTE — NUR
Patient brought to overflow room 23 at 1730, accompanied by 2 police officers. 
Patient belongings labeled and she will be put in green scrubs.

## 2022-07-29 NOTE — NUR
The patient has been resting on her bed but is awake and will occassionally 
call out that she expects to be discharged on Sunday.

## 2022-07-29 NOTE — NUR
Patient woke up asking that staff perform an arithmethic test on her cos she's 
leaving soon. redirection and encouragement provided. Patient laid back in bed 
at this time

## 2022-07-29 NOTE — NUR
-------------------------------------------------------------------------------

          *** Note undone in [facility] - 07/29/22 at 2123 by ABHI ***           

-------------------------------------------------------------------------------

The patient is watching tv and resting quietly on her bed

## 2022-07-29 NOTE — NUR
The patient is very irritable, guarded and uncooperative. She is refusing to 
eat and made a statement "this is not [facility]" She was abrupt and is 
refusing any kind of assessment or medications. She was made aware her BP was 
high and she is admanently refusing any kind of intervention. She refused to 
accept an assessment of any kind. Her affect is angry. She is laying on her bed 
and not interacting with others.

## 2022-07-29 NOTE — NUR
Patient is oriented to person, place and time. Denies any form of discomfort. 
No meds due. Monitoring ongoing.

## 2022-07-29 NOTE — NUR
Assumed care of this 67yr old female patient since 0630am, asleep. Easily 
arousable in no form of distress

## 2022-07-29 NOTE — NUR
Patient's bp slightly elevated. She denied any form of discomfort/pain or other 
concerns. safety maintained. Report given to pm rn for resumption of care.

## 2022-07-30 VITALS — SYSTOLIC BLOOD PRESSURE: 212 MMHG | DIASTOLIC BLOOD PRESSURE: 120 MMHG

## 2022-07-30 NOTE — NUR
Pt transfered to [facility]. Security and tech from [facility] took Pt upstairs with her 
belongings and original 5150. Pt was cooperatrive when she saw security and sat 
in wheel chair w/o issue.

## 2022-07-30 NOTE — NUR
Recieved Pt awake in bed. Pt got out of bed and demanded to see "her doctor". 
Pt seen by MD and is back in bed awake. Appears anxious. Makes statements but 
not wanting to dialogue.

## 2022-07-30 NOTE — NUR
Pt has been accepted by [facility], but due to staffing on [facility] she can not be brought 
up at this time. Pt remains certain that she must leave and can not be held. Pt 
does not want to eat lunch. Pt [id number] BP med as well.
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
## 2022-07-30 NOTE — NUR
The patient observed talking and looking at the wall. She appears to be 
responding to internal stimuli.

## 2022-07-30 NOTE — NUR
PAGER ID:  [phone] 

MESSAGE:  [facility] [id number] Re: Breanne guadalupe [id number] Patient BP is 212/120

## 2022-07-30 NOTE — NUR
Recieved patient via wheelchair from ER Overflow. PCT Faheem brought patient up to the floor. 
Patient was very agitated in the hallway and was refusing to do anything or go anywhere. As 
I approached patient I introduced myself and stated " lets go see your room" patient was 
compliant. Oriented patient to room and advised I needed to do quick assessment and skin 
check along with vitals on patient. Patient was compliant. Patient did need to use the 
restroom and while in there I could hear patient having some dry heaves. Patient stated she 
is nauseated but refused any medication. Patients /120  patient states that is 
not correct if it reads over 80 Patient stated she is fine and will not take any 
medications. Charge YAMILKA moore.

## 2022-07-30 NOTE — NUR
The patient has slept fairly well during the night. She remains easily agitated 
with any kind of nursing interventions "This is not a hospital!" "I'm out of 
here!"

## 2022-07-30 NOTE — NUR
UPON TECH BRINGING PT BREAKFAST TO BEDSIDE PT IMMEDIATELY STATED THAT THEY WERE 
NOT GOING TO EAT THE FOOD PLACED ON BEDSIDE TABLE. WHEN VIET ASKED IF THERE WAS 
A REASON FOR HER NOT EATING PT STATED "I'M NOT EATING ANYTHING IN THIS 
HOSPITAL. I'M LEAVING AT 5:30 TODAY, I WILL NOT EAT ANYHTING UNTIL THEN." TECH 
ASKED RN FOR GUIDANCE ON SITUATION RN REQUESTED TECH LEAVE FOOD AT BEDSIDE 
WHILE HE CONTINUED TO TALK TO PT. PT AGITATED AND SHORT WITH STAFF MEMEBERS.

## 2022-07-30 NOTE — NUR
Pt demanding that staff call police because [facility] is not a real hospital and she 
wants to be released. Pt is not able to process facts and remains in fixed 
delusions about not being on a hold, and [facility] not being a hospital. Pt angry 
and irritable, yet returned to her bed.

## 2022-07-31 VITALS — DIASTOLIC BLOOD PRESSURE: 98 MMHG | SYSTOLIC BLOOD PRESSURE: 152 MMHG

## 2022-07-31 NOTE — NUR
Nursing Progress Notes:



Problem: Per her 5150 Pt has been planning her death for the month of August and has been 
getting rid of all her personal belongings. She is unable to formulate a plan for food, 
clothing and shelter. She also has stopped all her medications. Patient currently lives at 
the [facility].



Intervention:  Introduced self to patient who did not verbally respond, and stood inside the 
bathroom with the bathroom door open and stated Get out of my room.  Informed the patient 
that she could close her bathroom door and I would wait until she was done to speak with 
her.  Patient came out of the bathroom and was invited to breakfast in the Community Room.  
Patient informed I dont eat breakfast.  PCT was unable to get patients 0800 vital signs, 
and she refused to have her vital signs checked at 0900.  Patient refused her physical 
assessment and 1:1 interview.  Patient ambulated in the hallway and came down to the Nurses 
station and stated Can someone check my heartrate?  (1048) Hr=74. P.O=96%. /98 
sitting Left arm. Patient repeated over and over Thank you Mr. Baig, while I was getting 
her vital signs.  Informed the patient that I was her Nurse and my name is Berkley.  Patient 
kept repeating the same response Thank you Mr. Baig approximately 5 more times.  When we 
had finished with her vital signs, the patient came back up to the Nurses station and asked 
What is my heart rate? Informed the patient that her heart rate was 74.  Patient stated 
Thats a lie, and abruptly walked away. At 1205, the patient walked up to the Observation 
Room and stated I wont eat yogurt in [place], and walked away.  Patient came down to the 
Community Room after informing this writer Im not eating lunch, and ate lunch at 
approximately 1245.  Patient showered this afternoon.



Response: Patient has refused offered services throughout the day.  Patient has not 
interacted with peers and has spent her day self-isolating in her room.  Patient refused 
physical assessment and patient interview.  Will continue to monitor closely. 



Plan: Continue to engage with the patient and build trust and rapport. She will likely need 
a Reise hearing for medications 2nd to severity of her disordered thought process. Encourage 
her to eat her meals and attend to ADLs. Assess at least q shift for physical consequences 
2nd to elevated BP.

## 2022-07-31 NOTE — NUR
Problem:

Per her 5150 Pt has been planning her death for the month of August and has been getting rid 
of all her personal belongings. She is unable to formulate a plan for food, clothing and 
shelter. She also has stopped all her medications. 



Intervention: 

Spoke with Dr. Behl and she was made aware that the patient is hypertensive and that she is 
refusing any kind of intervention or medicaton. Spoke with the patient and made her aware 
that her blood pressure is very high and that it was unsafe for her to have her BP that 
high. Assessed for severity of depressive symptoms and self harm risk. Assessed for 
psychotic symptoms and ability to provide appropriate decisions and plans for herself. 



Response:

The patient isolated in her room. She is not eating. She did not go out to have snack or eat 
dinner and she was also not eating in the ER. She is very guarded and has very poor insight. 
In response to take BP medications she stated that she has no need for BP medications and 
insists that "It's in the normal range" She also is adamant that she does not need 
psychiatric medications and stated, "They cannot order medications for me if I don't need 
it. I'm as sane as the everyone else" She denied that she was suicidal and when asked what 
she meant by having a death date in August she replied, "It's just a Congregational suggestion 
date" She tolerates one to one only minimally. She denies that she is having psychotic 
symptoms. She refused vital signs at change of shift. Her responses are not based on reality 
and she is unable to provide a plan for self care even here on the safety of the inpatient 
unit much less so if she were to be released. 



Plan;

Continue to engage with the patient and build trust and rapport. She will likely need a 
Reise hearing for medications 2nd to severity of her disordered thought process. Encourage 
her to eat her meals and attend to ADLs. Assess at least q shift for physical consequences 
2nd to elevated BP.

## 2022-07-31 NOTE — NUR
Malnutrition consult: Pt admitted w/ increased aggression and depression 

from group home per EMR. Pt unsure of wt loss per MST, current wt is consistent w/ wt hx 
dating back to 2020 in EMR. Pt appears WD/WN per ED note. Pt is noted to be refusing care 
and has refused first 2 meals, unsure of dietary intake PTA though given that wt has been 
consistent, she has likely been eating. No edema noted and w/ normal muscle strength. Pt 
does not meet minimum criteria for malnutrition at this time, though may be at high risk if 
continuing to refuse meals. Will continue to monitor.

-------------------------------------------------------------------------------

Addendum: 07/31/22 at 0726 by Lorenzo Low RD

-------------------------------------------------------------------------------

Amended: Links added.

## 2022-07-31 NOTE — NUR
angela Progress Notes:



Problem: Per her 5150 Pt has been planning her death for the month of August and has been 
getting rid of all her personal belongings. She is unable to formulate a plan for food, 
clothing and shelter. She also has stopped all her medications. Patient currently lives at 
the [facility].



Intervention:  Medication offered as ordered.  Provided with a safe and therapeutic 
environment, clear communication, active listening and positive encouragement



Response: Pt in bed at change of shift, isolates to herself. Pt is refusing vitals. Pt 
states "Im on a 5150 now get out!"  Pt was informed it was snack time and she chose to have 
a small snack. Pt was offered HS meds and she began screaming "Get out! Never! I will never 
EVER! take meds!" Pt continued to yell loudly and continuously until I left the room.



Plan: Continue to engage with the patient and build trust and rapport. She will likely need 
a Reise hearing for medications 2nd to severity of her disordered thought process. Encourage 
her to eat her meals and attend to ADLs. Assess at least q shift for physical consequences 
2nd to elevated BP.




-------------------------------------------------------------------------------

Addendum: 08/01/22 at 0435 by Cinthia Cervantes RN

-------------------------------------------------------------------------------

After all other patients had gone to bed, pt requested pen and paper and states "I would 
like to go to my reise hearing now, by order of the [facility]!" Explained to patient 
that there are no reise hearings scheduled for Sunday nights. I oriented pt to time. Pt held 
up a piece of paper she had things written on, but would not allow me to get close enough to 
read what she had written on it. Asked pt if we could go to her room and sit down and look 
at her paperwork? Pt states "No I only conduct business in the nurses station." Pt 
eventually went to sleep. Pt refused any attempts to get vitals from her, RR 14 while she is 
sleeping.
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
## 2022-08-01 NOTE — NUR
Nursing Progress Notes:



Problem: Per her 5150 Pt has been planning her death for the month of August and has been 
getting rid of all her personal belongings. She is unable to formulate a plan for food, 
clothing and shelter. She also has stopped all her medications. Patient currently lives at 
the [facility].



Intervention:   Medication offered as ordered.  Provided with a safe and therapeutic 
environment, clear communication, active listening and positive encouragement



Response:  Pt was resting in bed at change of shift. Attempted assessment and pt states "get 
out of my room! Pt refused vitals, and snacks offered. Pt offered HS meds and refused them 
by repeatedly saying "No, no, no!" and shouting loudly over my attempts to talk to her. 
Asked pt assessment questions and she continued to answer "No." to everything. Asked pt if 
she was having any pain and she states "why would I have pain? I've never had any pain 
before." Asked pt if she is at least comfortable? She states "Of course Im comfortable! now 
get out!" Pt denies having any needs. 



Plan: Continue to engage with the patient and build trust and rapport. She will likely need 
a Reise hearing for medications 2nd to severity of her disordered thought process. Encourage 
her to eat her meals and attend to ADLs. Assess at least q shift for physical consequences 
2nd to elevated BP.

## 2022-08-02 NOTE — NUR
NURSING PROGRESS NOTE



Problem: Per her 5150 Pt has been planning her death for the month of August and has been 
getting rid of all her personal belongings. She is unable to formulate a plan for food, 
clothing and shelter. She also has stopped all her medications. Patient currently lives at 
the [facility].



Intervention:   Medication offered as ordered. Provided with a safe and therapeutic 
environment, clear communication, active listening and positive encouragement. Encouraged to 




Response:  Received patient awake in her room at shift change. Writer attempted to say good 
morning and complete an assessment. Pt loudly stated get out of my room, when writer 
didnt she stated now! Pt refused her morning scheduled medication. Unable to assess 
current vitals as pt refused. When asked if she was on HTN medication pt stated no. I am 
fine! That machine does not work! Can you leave my room? Writer attempted to explain the 
risk and benefits of high blood pressure. Pt was served her 5250, pt refused to read it. Pt 
repeatedly came to writer and/or staff and said Per the constitution, I will be discharged 
tomorrow Wednesday @ 9:30, that is tomorrow (called writer by name); and I better be 
discharged. I invoke the constitution.  Writer again attempted to educate patient on 
5250, pt repeated the above statement. Pt isolated to room only to come out for meals or 
tell the staff about the constitution and how she wasnt going to be medicated. Pt later 
calmed, but remained in her room. Pt has been eating 100% of her meals. 



Plan: Pt continues to present with psychotic symptoms and refusing all medications and care. 
Pt will likely need a Reise hearing. Will continue to encourage pt to eat meals and attend 
to ADLs.

## 2022-08-02 NOTE — NUR
Initial: Pt admitted w/ increased aggression and depression

from group home per EMR. Pt is noted to refuse most care and medications. 

Currently on regular diet w/ mostly 100% intake of meals though she may 

refuse breakfast according to documentation. Will provide Smoothie WL and 

Shake WB to ensure needs are being met if pt only consumes 2 meals per 

day. LBM 7/30. Will continue to monitor.

Recs:

1. Continue Regular diet as tolerated

2. Smoothie WL, Shake WD

3. Bowel care PRN

4. Weekly wts

-------------------------------------------------------------------------------

Addendum: 08/02/22 at 0818 by Lorenzo Low RD

-------------------------------------------------------------------------------

Amended: Links added.

## 2022-08-03 NOTE — NUR
NURSING PROGRESS NOTE:



Problem: 

Per her 5150 Pt has been planning her death for the month of August and has been getting rid 
of all her personal belongings. She is unable to formulate a plan for food, clothing and 
shelter. She also has stopped all her medications. Patient currently lives at the [facility].



Intervention:

One to one with the patient to attempt to gain trust and rapport. Attempted to complete a 
physical assessment. Patient encouraged to take medications and was offered medications 
twice. She is on q 15 minute safety checks. Self harm risk assessed. Assessed for severity 
of thought disorder. 



Responses:

The patient presented as guarded and resistive to any kind of nursing intervention. She 
refused to have her lungs assessed. She initially refused to have any kind of conversation 
but did answer a few questions in a very minimal and clipped way. When asked if she ever had 
any suicidal statements she replied, "I'm not a danger to myself at all" She denies having 
any kind of mental illness. She answers most assessment questions with "I'm fine" At one 
point she blurted out off topic, "The house I was living in had a termite problem" When 
offered her HS medications for a 2nd time she replied, ""No! Forget it! Absolutely not!"  
She appeared adequately groomed and dressed for the inpatient unit. She did not have any 
behaviors that have required redirection. She is not socializing with staff or peers. She 
declined to come out for the evening snack. Her insight and judgement are very poor and she 
has not been accepting medications since admission. 



Plan: 

Continue to attempt to engage patient and build trust and rapport. Continue to offer 
medications. Encourage self care of ADLs. Patient continues on q 15 minute safety checks. 
Assess for severity of thought disorder and self harm risk at least q shift.

## 2022-08-03 NOTE — NUR
NURSING PROGRESS NOTE: Breanne



Problem: 

Per her 5150 Pt has been planning her death for the month of August and has been getting rid 
of all her personal belongings. She is unable to formulate a plan for food, clothing and 
shelter. She also has stopped all her medications. Patient currently lives at the [facility].



Intervention:   

Writer continues to provide pt. with a safe and therapeutic environment, clear 
communication, active listening and positive encouragement. Pt. encouraged to participate on 
unit and in group therapy, and 1:1 assessment provided with medication administration. 
Q15min checks continue for pt. safety



Response:  Pt. refuses to participate in full bedside assessment, she yelled at writer 
repeatedly no medicine, get out Attempted to de-escalate pt; not effective. Provider 
notified of med refusal. Pt. spent most of the shift in her room standing or pacing, however 
she has been observed out of her room a few times often repeating I have rights, 
constitution rights but also requested a shower. Pt. presents as irritable and paranoid, 
she again was offered medication, but refused. She appears clean, hair nicely brushed and 
wearing street clothes. 



Plan: 

Pt continues to present with psychotic symptoms and refusing all medications and care. Pt 
will likely need a Reise hearing. Will continue to encourage pt to eat meals and attend to 
ADL

## 2022-08-03 NOTE — NUR
NURSING PROGRESS NOTE



Problem: Per her 5150 Pt has been planning her death for the month of August and has been 
getting rid of all her personal belongings. She is unable to formulate a plan for food, 
clothing and shelter. She also has stopped all her medications. Patient currently lives at 
the [facility].



Intervention:   Medication offered as ordered. Provided with a safe and therapeutic 
environment, clear communication, active listening and positive encouragement. Encouraged to 




Response:  Received patient awake in her room at shift change. W







Plan: Pt continues to present with psychotic symptoms and refusing all medications and care. 
Pt will likely need a Reise hearing. Will continue to encourage pt to eat meals and attend 
to ADLs.

-------------------------------------------------------------------------------

Addendum: 08/03/22 at 0425 by Angela Stewart RN

-------------------------------------------------------------------------------

NURSING PROGRESS NOTE



Problem: Per her 5150 Pt has been planning her death for the month of August and has been 
getting rid of all her personal belongings. She is unable to formulate a plan for food, 
clothing and shelter. She also has stopped all her medications. Patient currently lives at 
the [facility].



Intervention:   Medication offered as ordered. Provided with a safe and therapeutic 
environment, clear communication, active listening and positive encouragement. Encouraged to 




Response:  Received patient awake in her room at shift change. I do not recall patient 
participating in group snack. Patient refused assessment and PM medication administration. 
She yelled when I entered her group to "get out right now!". Kept repeating "No, no no!". I 
left patient alone and returned medication. Approximately three hours later, patient ran out 
of room with hands in air, down the becerra, and to the nurses station. She said nothing, and 
ran back to her room. Got back in bed, remained for rest of night. 







Plan: Pt continues to present with psychotic symptoms and refusing all medications and care. 
Pt will likely need a Reise hearing. Will continue to encourage pt to eat meals and attend 
to ADLs.

## 2022-08-04 NOTE — NUR
CM-Continuity of Care



Presenting Issues: 

Pt is making minimal progress and refusing to participate in the care plan. Probable Cause 
Hearing is today, attending physician will provide a Reise petition to administer meds w/o 
pt's consent. Furthermore, pt has lost her housing @ [facility]. 



Interventions: 

Clinician contacted pt's [facility] CM- Maricel Watts @ [phone], left vm requesting rt t/c to 
explore possibility of [facility] services upon d/c. 



Plan: 

Clinician will continue to monitor and engage SCM in dcp activities when appropriate. 

Katalina Zacarias LCSW

-------------------------------------------------------------------------------

Addendum: 08/04/22 at 0955 by Katalina Zacarias SS

-------------------------------------------------------------------------------

Amended: Links added.

## 2022-08-04 NOTE — NUR
NURSING PROGRESS NOTE: Breanne



Problem: 

Per her 5150 Pt has been planning her death for the month of August and has been getting rid 
of all her personal belongings. She is unable to formulate a plan for food, clothing and 
shelter. She also has stopped all her medications. Patient currently lives at the [facility].



Intervention:   

Writer continues to provide pt. with a safe and therapeutic environment, clear 
communication, active listening and positive encouragement. Pt. encouraged to participate on 
unit and in group therapy, and 1:1 assessment provided with medication administration. 
Q15min checks continue for pt. safety



Response: 

 Pt. again refused to participate in bedside assessment repeatedly stating no medicine, get 
out but then apologized to writer that she raised her voice.  Pt. also refused her 
medications and VS and is non-compliant with nursing staff. Pt. was notified breakfast was 
delivered and ready, but she refused to come to the dining room and did not eat. She spent 
most of the morning in her room, but would come out intermittently and stand at random 
places in the unit. Pt. was encouraged to go to the dining room for lunch and ate 100% of 
her meal. She appears well groomed and is wearing street clothes. Pt. has been observed 
interacting with cohorts and fails to respond to any passing staff that may say hello. Pt. 
did approach writer before hearing requesting clean clothes. 



Plan: 

Pt continues to present with psychotic symptoms and refusing all medications and care. Pt 
will likely need a Reise hearing. Will continue to encourage pt to eat meals and attend to 
ADLs.

## 2022-08-04 NOTE — NUR
Patient tells this writer "I'm not going to take any medicationd. Don't ever ask me again, 
and get the hell out of my room.

## 2022-08-04 NOTE — NUR
Echo refusal:

    Pt refused to allow an Echo procedure. Pt states "My heart is fine. I dont need any 
tests."

## 2022-08-05 VITALS — DIASTOLIC BLOOD PRESSURE: 88 MMHG | SYSTOLIC BLOOD PRESSURE: 187 MMHG

## 2022-08-05 RX ADMIN — Medication SCH TAB: at 07:26

## 2022-08-05 RX ADMIN — OLANZAPINE SCH MG: 5 TABLET, ORALLY DISINTEGRATING ORAL at 20:00

## 2022-08-05 NOTE — NUR
NURSING PROGRESS NOTE: Breanne



Problem: 

Per her 5150 Pt has been planning her death for the month of August and has been getting rid 
of all her personal belongings. She is unable to formulate a plan for food, clothing and 
shelter. She also has stopped all her medications. Patient currently lives at the [facility].



Intervention:   

Writer continues to provide pt. with a safe and therapeutic environment, clear 
communication, active listening and positive encouragement. Pt. encouraged to participate on 
unit and in group therapy, and 1:1 assessment provided with medication administration. 
Q15min checks continue for pt. safety



Response: 

This patient self isolated in her room following shift change. She is clean and well 
dressed. This writer attempted multiple discussions with this patient without success. 
Patient is resistant to conversation. In short conversation the patient tells this writer 
she wants to leave the unit. She will not elaborate on a plan. She is labile when asked 
questions. The patient refuses all vital signs. The patient does deny S/I or H/I. She tells 
this writer she is not going to take any medications, "You can just get the hell out of my 
room." A Fred hearing is planned for the near future.



Plan: 

Pt continues to present with psychotic symptoms and refusing all medications and care. Pt 
will likely need a Reise hearing. Will continue to encourage pt to eat meals and attend to 
ADLs.

## 2022-08-05 NOTE — NUR
Patient offered PO Olazapine 5 mg. Patient is well oriented and refuses. This writer 
explained to the patient that she is required to take PO or IM per her Riese hearing today. 
Patient became labile and still refuses the medication. An IM preoperation will be drawn up 
and administered when security arrives to assist with patient and staff safety.

## 2022-08-05 NOTE — NUR
NURSING PROGRESS NOTE: Breanne



Problem: 

Per her 5150 Pt has been planning her death for the month of August and has been getting rid 
of all her personal belongings. She is unable to formulate a plan for food, clothing and 
shelter. She also has stopped all her medications. Patient currently lives at the [facility].



Intervention:   

Writer continues to provide pt. with a safe and therapeutic environment, clear 
communication, active listening and positive encouragement. Pt. encouraged to participate on 
unit and in group therapy, and 1:1 assessment provided with medication administration. 
Q15min checks continue for pt. safety



Response: 

 Pt. approached this writer at the beginning of shift, and was observed ripping up legal 
paperwork and yelling  I wont accept this referring to the Reise documentation pt. had 
been given earlier.  Pt. returned 5 min. later with torn paperwork and proceeded to rip the 
papers into further pieces, and yelling I will tell Manuela After about 10min pt. returned 
and stated I want a shower, clean clothes, I live in [place] there is 49 other states Pt. 
left and returned numerous times presenting as disorganized and agitated. She again refused 
to participate in bedside assessment and allowing staff to obtain VS. Pt. attended a Reise 
hearing with her  behind closed doors. Pt. later exited her hearing and 
approached writer stating I will not comply she was given lunch and went to her room. Pt. 
does not interact with cohorts on the unit, and is often observed standing in the becerra with 
the appearance she wants to talk, but doesnt. This writer offered PRN for anxiety; she 
refused needing anything. 

Plan: 

Pt continues to present with psychotic symptoms and refusing all medications and care. Pt 
will likely need a Reise hearing. Will continue to encourage pt to eat meals and attend to 
ADLs.

## 2022-08-05 NOTE — NUR
Geno note:

     Called Patient advocate about pts riese Appeal. She was not able to give much info. 
Called the . He is filing the Reise appeal on Monday.

## 2022-08-06 VITALS — DIASTOLIC BLOOD PRESSURE: 68 MMHG | SYSTOLIC BLOOD PRESSURE: 131 MMHG

## 2022-08-06 RX ADMIN — OLANZAPINE SCH MG: 5 TABLET, ORALLY DISINTEGRATING ORAL at 21:02

## 2022-08-06 RX ADMIN — OLANZAPINE SCH MG: 5 TABLET, ORALLY DISINTEGRATING ORAL at 08:00

## 2022-08-06 RX ADMIN — Medication SCH TAB: at 08:00

## 2022-08-06 RX ADMIN — OLANZAPINE PRN MG: 10 INJECTION, POWDER, FOR SOLUTION INTRAMUSCULAR at 22:54

## 2022-08-06 NOTE — NUR
NURSING PROGRESS NOTE



Problem: 

Per her 5150 Pt has been planning her death for the month of August and has been getting rid 
of all her personal belongings. She is unable to formulate a plan for food, clothing and 
shelter. She also has stopped all her medications. Patient currently lives at the [facility].



Intervention:   

Writer continues to provide pt. with a safe and therapeutic environment, clear 
communication, active listening and positive encouragement. Pt. encouraged to participate on 
unit and in group therapy, and 1:1 assessment provided with medication administration. 
Q15min checks continue for pt. safety



Response: 

Received Pt awake in her room and in no distress at the beginning of this shift. Pt was 
cooperative with vitals but refused AM meds. Pt was demanding to see proof that her writ was 
filed and was rude and demanding. Pt stated the temperature machine tests for cocaine and 
injects it. Pt talked to nurses multiple times to clarify past statements and HX related 
to meeting requirements of my Buddhist,        a-athiest.I believe in God. Pt had phone 
priveledges revoked r/t calling 911. Pt refused meals today, stating she had to get the 
Zyprexa out of her. 



Plan: 

Pt continues to present with psychotic symptoms and refusing all medications and care. Pt 
will likely need a Reise hearing. Will continue to encourage pt to eat meals and attend to 
ADL

## 2022-08-06 NOTE — NUR
NURSING PROGRESS NOTE:



Problem: 

Per her 5150 Pt has been planning her death for the month of August and has been getting rid 
of all her personal belongings. She is unable to formulate a plan for food, clothing and 
shelter. She also has stopped all her medications. Patient currently lives at the [facility].



Intervention:   

Writer continues to provide pt. with a safe and therapeutic environment, clear 
communication, active listening and positive encouragement. Pt. encouraged to participate on 
unit and in group therapy, and 1:1 assessment provided with medication administration. 
Q15min checks continue for pt. safety



Response: 

 This patient primarily self isolates in her room this shift. She is labile, she has lost 
her Reise hearing. The patient plans to appeal. The patient refused oral medications. 
Patient was advised that oral medication is better than a shot. Patient refuses both. IM 
med's were given at bedside with security standing by. Patient came to the nursing station 
multiple times during the night to express her anger. This patient remains very labile and 
delusional. 

Pt continues to present with psychotic symptoms and refusing all medications and care. Pt 
states she now conduct a hunger strike.

## 2022-08-06 NOTE — NUR
Reassessment: Pt continues eating well, since last RD assessment (8/2) pt documented with 
% PO intake of all meals with the exception of refusing one breakfast tray. Overall pt 
meeting estimated nutrient needs at this time. LBM 8/3 though per EMR pt A/O x 3, confused, 
and refusing physical assessment so unsure of accuracy of documented LBM. D/w dietary to 
send prunes and prune juice with next meal in case pt truly day three with no BM. Will 
continue to follow and monitor need for further nutrition intervention.

Recommendations:

1. Continue regular diet

2. Smoothie WL, Shake WS

3. Bowel care PRN

4. Weekly scaled wts

-------------------------------------------------------------------------------

Addendum: 08/06/22 at 0756 by Dariana Fuentes RD

-------------------------------------------------------------------------------

Amended: Links added.

## 2022-08-06 NOTE — NUR
Phone rights denied today:

    Pt called 911 today. Dispatch called and notified nursing staff of the phone call. 
Explained to pt she is not allowed and pt refuses to accept she is not allowed to call 911. 
Pt has her phone rights denied today.

## 2022-08-07 VITALS — DIASTOLIC BLOOD PRESSURE: 75 MMHG | SYSTOLIC BLOOD PRESSURE: 129 MMHG

## 2022-08-07 VITALS — SYSTOLIC BLOOD PRESSURE: 177 MMHG | DIASTOLIC BLOOD PRESSURE: 95 MMHG

## 2022-08-07 RX ADMIN — OLANZAPINE SCH MG: 5 TABLET, ORALLY DISINTEGRATING ORAL at 08:05

## 2022-08-07 RX ADMIN — OLANZAPINE SCH MG: 5 TABLET, ORALLY DISINTEGRATING ORAL at 21:36

## 2022-08-07 RX ADMIN — Medication SCH TAB: at 08:00

## 2022-08-07 NOTE — NUR
Nursing Progress Note:



Problem:  Per her 5150 Pt has been planning her death for the month of August and has been 
getting rid of all her personal belongings. She is unable to formulate a plan for food, 
clothing and shelter. She also has stopped all her medications. Patient currently lives at 
the [facility].



Intervention:   Writer continues to provide pt. with a safe and therapeutic environment, 
clear communication, active listening and positive encouragement. Pt. encouraged to 
participate on unit and in group therapy, and 1:1 assessment provided with medication 
administration. Q15min checks continue for pt. safety



Response:  Patient awake this morning and in group room for breakfast. When brought her 
morning Zyprexa, patient said I will not take it. When told it was mandated by court 
order, I will not take it, it hasnt been 24 hours. When told it was ordered for twice a 
day, and if she didnt, she would just get the shot, she finally relented, but Im not 
taking it again, I know my rights. She has been up and down all day, in and out of her 
room, going to nurses station and making statements, I dont have any blood relatives. I 
dont have any next of kin, good bye. Patient usually makes these statements twice, before 
making new statements. She refused to be weighed today.



Plan:  Pt continues to present with psychotic symptoms and refusing all medications and 
care. Pt has had a Reise hearing and lost. She is now mandated. Will continue to encourage 
her to eat meals and attend to her ADLs.

## 2022-08-07 NOTE — NUR
Early in shift this writer had a discussion with EBONY Garcias regarding patients anger, 
medical refusal, labile behavior, and her delusional state. The patient is now on a Rease 
per the court system. The patient protests but has no understanding. The patient will not 
listen to explanation  Per the provider the patient will be given Zyprexa 5 mg IM at 2000 
hours tonight if she refuses her PO Zyprexa. In addition Ativan 1 mg will be given 1 mg PO 
at 2000 hours. This writer solicited the assistance of a LVN to try and help convince to 
take ordered PM medications. After a long discussion with the patient and multiple 
explanations of the patients legal status the patient agreed to take her med's as ordered. 
Following much discussion and med administration, the patient expressed hunger and ate a 
turkey sandwich and a banana The patient later remained in a labile mood and exhibited no 
improvement in her mental status. The patient complained to the Charge RN multiple times 
about the unfairness of the Reise order. Patient exhibits a lack of insight, she remains 
psychotic, she will not consent to a mental health interview. last but not least the patient 
angers her roommates. The only improvement noted by this writer is that the patient is now 
exiting her room more. It is unfortunate that she does so to complain about having to take 
her medications and exhibit her lack of understanding of the Reise process. The patient has 
no understanding of why she needs medications. [facility] is that the 
patients protest, self imposed hunger strike was ended when she ate the banana and turkey 
sandwich. Please follow through with EBONY Thompson for potential changes in patients 
medication orders for this new day and evening.

## 2022-08-08 VITALS — SYSTOLIC BLOOD PRESSURE: 169 MMHG | DIASTOLIC BLOOD PRESSURE: 94 MMHG

## 2022-08-08 VITALS — SYSTOLIC BLOOD PRESSURE: 156 MMHG | DIASTOLIC BLOOD PRESSURE: 84 MMHG

## 2022-08-08 RX ADMIN — Medication SCH TAB: at 08:00

## 2022-08-08 RX ADMIN — OLANZAPINE SCH MG: 5 TABLET, ORALLY DISINTEGRATING ORAL at 08:44

## 2022-08-08 RX ADMIN — OLANZAPINE SCH MG: 5 TABLET, ORALLY DISINTEGRATING ORAL at 20:04

## 2022-08-08 NOTE — NUR
NURSING PROGRESS NOTE



Problem: Per her 5150 Pt has been planning her death for the month of August and has been 
getting rid of all her personal belongings. She is unable to formulate a plan for food, 
clothing and shelter. She also has stopped all her medications. Patient currently lives at 
the [facility].



Intervention:   Provided with a safe and therapeutic environment, clear communication, 
active listening and positive encouragement. Administered medication as ordered per Rerakel 
with no adverse side effects, encouraged to participate with ADLs and participated in 
groups. 



Response:  Received patient sleeping restfully at shift change. Pt woke after vitals and 
remained in her room. Pt was compliant with vitals, refused her physical assessment. Pt took 
her psyche medication, but refused her non psyche meds. Pt continues to present with 
delusional, disorganized thoughts. Pt opened her mouth and stuck out her tongue that is my 
proof I took my medicine.  Pt has poor insight and judgement. Writer attempted to educate 
pt on the risks of not taking her HTN med as her BP has been elevated. Pt stated I have had 
my blood pressure taken and it is below 80 we are not going to go over that again. Pt 
continues to be easily agitated and will not talk to writer about her mental health. Pt 
wanted to cut her bangs and when told she couldnt have scissors she went to other staff to 
obtain a pair, which were not given to her. Pt isolates to her room, but comes out for meals 
and snacks. Pt is eating 100% of her meals. 



Plan: Pt continues to present with psychotic symptoms and refusing all medications and care. 
Pt will likely need a Reise hearing. Will continue to encourage pt to eat meals and attend 
to ADLs.


-------------------------------------------------------------------------------

Addendum: 08/08/22 at 1707 by Maricel Almazan RN

-------------------------------------------------------------------------------

PT IS REFUSING HER WEEKLY WEIGHT SAYING "THE BLOOD PRESSURE MACHINE ISN'T WORKING, SO I'M 
NOT SURE ABOUT THE SCALE, SO NO!"

## 2022-08-08 NOTE — NUR
Nursing Progress Note:



Problem:  Per her 5150 Pt has been planning her death for the month of August and has been 
getting rid of all her personal belongings. She is unable to formulate a plan for food, 
clothing and shelter. She also has stopped all her medications. Patient currently lives at 
the [facility].



Intervention:   Writer continues to provide pt. with a safe and therapeutic environment, 
clear communication, active listening and positive encouragement. Pt. encouraged to 
participate on unit and in group therapy, and 1:1 assessment provided with medication 
administration. Q15min checks continue for pt. safety



Response:  Pt in room at start of shift.  Denied MH symptoms.  Came to group room for snack. 
 Rest of shift Isolated to room.  Took all medications and went to sleep



Pt woke up at 2345 came to Nurses Station knocked on open door and said in a Monotone 

"I am Breanne Eaves I have no next of Kin. I need to speak to the person in charge of 
registration here at [facility]." 



 Then turned and went back down the becerra to her room.  She repeated this every couple of 
minutes x3.  



 Pt resistant to redirection.  After several attempts to redirect and asking why she kept 
repeating the same thing pt said she nervous about a hearing she has on Monday.  Pt declined 
any PRN medications.  Allowing pt to verbalize feelings seemed to help and pt stopped coming 
to the Nurses station and went to sleep.  



Plan:  Pt continues to present with psychotic symptoms and refusing all medications and 
care. Pt has had a Reise hearing and lost. She is now mandated. Will continue to encourage 
her to eat meals and attend to her ADLs.

## 2022-08-08 NOTE — NUR
PT REFUSING WEEKLY WEIGHT. 

-------------------------------------------------------------------------------

Addendum: 08/08/22 at 1708 by Maricel Almazan RN

-------------------------------------------------------------------------------

Amended: Links added.

## 2022-08-09 VITALS — DIASTOLIC BLOOD PRESSURE: 89 MMHG | SYSTOLIC BLOOD PRESSURE: 145 MMHG

## 2022-08-09 RX ADMIN — OLANZAPINE SCH MG: 5 TABLET, ORALLY DISINTEGRATING ORAL at 07:55

## 2022-08-09 RX ADMIN — OLANZAPINE SCH MG: 5 TABLET, ORALLY DISINTEGRATING ORAL at 20:20

## 2022-08-09 RX ADMIN — Medication SCH TAB: at 08:00

## 2022-08-09 NOTE — NUR
NURSING PROGRESS NOTE



Problem: Per her 5150 Pt has been planning her death for the month of August and has been 
getting rid of all her personal belongings. She is unable to formulate a plan for food, 
clothing and shelter. She also has stopped all her medications. Patient currently lives at 
the [facility].



Intervention:   Provided with a safe and therapeutic environment, clear communication, 
active listening and positive encouragement. Administered medication as ordered per Rerakel 
with no adverse side effects, encouraged to participate with ADLs and participated in 
groups. 



Response:  Received patient awake in her room at shift change. Pt continues to be tangential 
in her speech, repeating to writer I have my habeas corpus hearing today.  Pt refused her 
vitals this morning and again refusing her non-psyche medications. Pt took her Zyprexa 
stating I have my habeas corpus hearing today. I will comply at this time, but when I am 
through I will no longer take my medicine. Pt opened her mouth (to show pill on her tongue) 
made three uh-uh sounds (as if counting), my proof. Writer explained she would have to win 
her hearing to make that choice. Pt continues to be fixated on cutting her bangs, she 
refused for staff to assist her. Pt left unit at 1310   to attend court. Pt was escorted by 
staff. Pts hold was held up by . Pt came back agitated and immediately began making 
phone calls to her . Pt repeated stated I am going to appeal my Habeas 
Corpus. My name is Breanne Franz and I am going to appeal to [facility]. I will take my medication because I have too, but only under protest. 



Plan: Pt continues to present with psychotic symptoms, only taking psyche medications 
because she is Reised. Pt continues to be resistive to care, not taking her non psyche 
medications. Pt is unable to plan for food, shelter or clothing. Pt needs more time for 
medication to reach therapeutic level and a safe DCP needs to be developed.

## 2022-08-09 NOTE — NUR
NURSING PROGRESS NOTE: Breanne



Problem: Per her 5150 Pt has been planning her death for the month of August and has been 
getting rid of all her personal belongings. She is unable to formulate a plan for food, 
clothing and shelter. She also has stopped all her medications. Patient currently lives at 
the [facility].



Intervention:   Provided with a safe and therapeutic environment, clear communication, 
active listening and positive encouragement. Administered medication as ordered per Reise 
with no adverse side effects, encouraged to participate with ADLs and participated in 
groups. 



Response:  Received pt resting in bed.  Pt was compliant with vitals, refused her physical 
assessment. Pt took HS medications and when asked about MH symptoms pt states 1, 2, 3, 4, 
5.  She stated that her writ hearing is tomorrow and wanted this RN to know about it.   Pt 
continues to present with delusional, disorganized thoughts. After snacks pt went back to 
bed. 



Plan: Pt continues to present with psychotic symptoms and refusing all medications and care. 
Pt will likely need a Reise hearing. Will continue to encourage pt to eat meals and attend 
to ADLs.

## 2022-08-09 NOTE — NUR
CM-Pre-d



Presenting Issues: 

Per Probable Cause Hearing, 5250 was upheld. Pt filed a Writ & will appeal. Writ Hearing's 
today. 



Interventions: 

Clinician met w/pt and engaged her in pre-dcp activities to prepare for possibility of pt 
winning her appeal this afternoon. 

Per session, pt plans to leave [facility] when she's released. Pt did not have a plan for 
where she would go if she were to leave the area and has no plans for continuing mental 
health treatment. 



Plan: 

Clinician will continue to monitor. 

Katalina Zacarias LCSW

-------------------------------------------------------------------------------

Addendum: 08/09/22 at 1036 by Katalina Zacarias SS

-------------------------------------------------------------------------------

Amended: Links added.

## 2022-08-10 VITALS — SYSTOLIC BLOOD PRESSURE: 166 MMHG | DIASTOLIC BLOOD PRESSURE: 91 MMHG

## 2022-08-10 VITALS — SYSTOLIC BLOOD PRESSURE: 172 MMHG | DIASTOLIC BLOOD PRESSURE: 91 MMHG

## 2022-08-10 VITALS — DIASTOLIC BLOOD PRESSURE: 89 MMHG | SYSTOLIC BLOOD PRESSURE: 155 MMHG

## 2022-08-10 RX ADMIN — OLANZAPINE SCH MG: 5 TABLET, ORALLY DISINTEGRATING ORAL at 08:19

## 2022-08-10 RX ADMIN — OLANZAPINE SCH MG: 5 TABLET, ORALLY DISINTEGRATING ORAL at 20:06

## 2022-08-10 RX ADMIN — Medication SCH TAB: at 08:00

## 2022-08-10 NOTE — NUR
NURSING PROGRESS NOTE: Breanne



Problem: Per her 5150 Pt has been planning her death for the month of August and has been 
getting rid of all her personal belongings. She is unable to formulate a plan for food, 
clothing and shelter. She also has stopped all her medications. Patient currently lives at 
the [facility].



Intervention:   Provided with a safe and therapeutic environment, clear communication, 
active listening and positive encouragement. Administered medication as ordered per Geno 
with no adverse side effects, encouraged to participate with ADLs and participated in 
groups. 



Response:  Received patient resting in her bed.  Pt up for snacks in community room.  Pt 
took HS medications and afterwards stuck her tongue out and said see I took them all.  Pt 
made no other conversation with this RN and went back to bed. 



Plan: Pt continues to present with psychotic symptoms, only taking psyche medications 
because she is Reised. Pt continues to be resistive to care, not taking her non psyche 
medications. Pt is unable to plan for food, shelter or clothing. Pt needs more time for 
medication to reach therapeutic level and a safe DCP needs to be developed.

## 2022-08-10 NOTE — NUR
NURSING PROGRESS NOTE 



Problem: Per her 5150 Pt has been planning her death for the month of August and has been 
getting rid of all her personal belongings. She is unable to formulate a plan for food, 
clothing and shelter. She also has stopped all her medications. Patient currently lives at 
the [facility].



Intervention:   Provided with a safe and therapeutic environment, clear communication, 
active listening and positive encouragement. Administered medication as ordered per Rerakel 
with no adverse side effects, encouraged to participate with ADLs and participated in 
groups. 



Response: 1:1 done at bedside, She denies S/HI, A/VH and states she slept good. Pt. took 
only prescribed psychiatric medications this shift, refusing her BP and aspirin orders. Pt. 
educated about the importance of taking her BP medications especially since her BP reading 
was elevated at 172/91 this shift. Pt. responded, Its a false reading if its above 80. 
Pt. repeatedly stating she is protesting her hold and is only taking her medications due to 
the court ordering this. BP re-check was 166/91 p. 91.   



Plan: Pt continues to present with psychotic symptoms, only taking psyche medications 
because she is Reised. Pt continues to be resistive to care, not taking her non psyche 
medications. Pt is unable to plan for food, shelter or clothing. Pt needs more time for 
medication to reach therapeutic level and a safe DCP needs to be developed.

## 2022-08-10 NOTE — NUR
CM-Continuity of Care



Presenting Issues: 

Pt lost her Writ/Appeal and was ordered to comply w/psychotropic medication treatment @ [facility]. 
Clinician review chart and noted pt did take her psychotropics last night but continues to 
decline medication to manage heart & cancer condition.  



Interventions: 

Clinician met w/pt this morning @ bedside provided reflective listening to facilitate pt 
processing of the recent Court decision re psychotropic medication treatment.  Pt expressed 
her disappointment that the  had ordered that she take the meds and states, "I'll take 
the meds while I'm here because I have to, but I won't take them when I leave." Clinician 
asked pt if she would like a referral to [facility] when she's ready to d/c, pt declined saying, 
"I will leave [place], leave [facility]." 



Pt continues to be resistive to care activities that are not mandated by the Court (i.e. 
medications to manage chronic medical conditions). In addition, pt appears to be very 
guarded & paranoid still. 



Clinician had t/c w/pt's NEVAEH/MEL Connelly @ [facility]- [phone] to update 
her on pt's status & progress and to provide contact info for Mary Carmen Elizabeth & China Huerta as this clinician will leave [facility] tomorrow. 



Plan: 

SS team will continue to monitor pt's progress, engage her in dcp activities when 
appropriate & coordinate aftercare plan/services w/[facility] as appropriate to support discharge. 


Katalina Zacarias LCSW








-------------------------------------------------------------------------------

Addendum: 08/10/22 at 1205 by Katalina HERNANDEZ

-------------------------------------------------------------------------------

Amended: Links added.

## 2022-08-11 VITALS — DIASTOLIC BLOOD PRESSURE: 82 MMHG | SYSTOLIC BLOOD PRESSURE: 134 MMHG

## 2022-08-11 RX ADMIN — OLANZAPINE PRN MG: 10 INJECTION, POWDER, FOR SOLUTION INTRAMUSCULAR at 21:05

## 2022-08-11 RX ADMIN — OLANZAPINE SCH MG: 5 TABLET, ORALLY DISINTEGRATING ORAL at 08:35

## 2022-08-11 RX ADMIN — OLANZAPINE SCH MG: 5 TABLET, ORALLY DISINTEGRATING ORAL at 20:00

## 2022-08-11 RX ADMIN — Medication SCH TAB: at 08:00

## 2022-08-11 NOTE — NUR
NURSING PROGRESS NOTE 



Problem: Per her 5150 Pt has been planning her death for the month of August and has been 
getting rid of all her personal belongings. She is unable to formulate a plan for food, 
clothing and shelter. She also has stopped all her medications. Patient currently lives at 
the [facility].



Intervention:   Provided with a safe and therapeutic environment, clear communication, 
active listening and positive encouragement. Administered medication as ordered per Reise 
with no adverse side effects, encouraged to participate with ADLs and participated in 
groups. 



Response: Pt awake at change of shift and requesting to take a shower. This nurse provided 
her with a shower. 1:1 done at bedside, pt. continues to only take psychiatric medication, 
refusing her BP and aspirin orders. She believes the vital machines are inaccurate and 
states if there are readings above 80, my heart would burst and I would die.  Pt continues 
to state she is only taking her psychiatric medications because they are mandated and is 
requesting to see the provider. She was informed she will be seeing him today when he does 
his rounds. She denies V/AH and S/HI. She spent the majority of the shift isolating herself 
in her room sitting in her recliner. She appears to preoccupied, but continues to deny A/VH. 




Plan: Pt continues to present with psychotic symptoms, only taking psyche medications 
because she is Reised. Pt continues to be resistive to care, not taking her non psyche 
medications. Pt is unable to plan for food, shelter or clothing. Pt needs more time for 
medication to reach therapeutic level and a safe DCP needs to be developed.

## 2022-08-11 NOTE — NUR
NURSING PROGRESS NOTE 



Problem: Per her 5150 Pt has been planning her death for the month of August and has been 
getting rid of all her personal belongings. She is unable to formulate a plan for food, 
clothing and shelter. She also has stopped all her medications. Patient currently lives at 
the [facility].



Intervention:   Provided with a safe and therapeutic environment, clear communication, 
active listening and positive encouragement. Administered medication as ordered per Rerakel 
with no adverse side effects, encouraged to participate with ADLs and participated in 
groups. 



Response:Patient in room at shift change sitting in recliner at edge of bed. Patient 1:1, Pt 
denies any MH s/s. Patient was reminded that she has to take her evening meds and I didn't 
want her running directly to the restroom afterwards in fear of her vomiting or spitting her 
pills out. patient said she understood and would open her mouth to show me the pill was gone 
then go directly to the bathroom. It was reiterated to the patient that I did not feel 
comfortable going straight to the bathroom after taking her pills. The pt reported being 
able to go to the bathroom whenever she liked to. patient ate snack in community room. 
Patient was told to come in hallway and take medication in applesauce. Pt demanded to see 
dosage and type of pills, patient then refused to take meds in apple sauce, pt then stated 
she would go to the bathroom afterwards and we had no right to stop her. Provider called an 
order for 5mg Zyprexa issued, security called. Patient took IM and immediately got up and 
went to room. Patient went to sleep in room shortly after w/o further incidence. 



Plan: Pt continues to present with psychotic symptoms, only taking psyche medications 
because she is Reised. Pt continues to be resistive to care, not taking her non psyche 
medications. Pt is unable to plan for food, shelter or clothing. Pt needs more time for 
medication to reach therapeutic level and a safe DCP needs to be developed.

## 2022-08-11 NOTE — NUR
NURSING PROGRESS NOTE 



Problem: Per her 5150 Pt has been planning her death for the month of August and has been 
getting rid of all her personal belongings. She is unable to formulate a plan for food, 
clothing and shelter. She also has stopped all her medications. Patient currently lives at 
the [facility].



Intervention:   Provided with a safe and therapeutic environment, clear communication, 
active listening and positive encouragement. Administered medication as ordered per Reise 
with no adverse side effects, encouraged to participate with ADLs and participated in 
groups. 



Response:Patient in room at shift change. Pt 1:1, patient denies A/V/H, Si/HI. Patient 
isolated to room most of the evening and made minimal conversation. Patient refused evening 
snack. brought patient her evening meds, pt states that she had already taken her meds. The 
pt was reminded that she takes her meds twice a day. Patient took medication and put in 
mouth then went straight to the bathroom and stood over the toilet. This nurse followed the 
pt into the bathroom and the pt still had the pill on her tongue. The pt eventually 
swallowed pill when she opened her mouth and showed it was no longer there. patient then 
began yelling, "Get out!, Get out of my room!" This nurse left so the pt could calm down. 
The pt then sat in a chair in her room and stated she was waiting for the Dr. Pt was 
reminded that the Provider already came in today, she would need to see the Provider 
tomorrow. The pt was able to go to sleep a short time after.



Plan: Pt continues to present with psychotic symptoms, only taking psyche medications 
because she is Reised. Pt continues to be resistive to care, not taking her non psyche 
medications. Pt is unable to plan for food, shelter or clothing. Pt needs more time for 
medication to reach therapeutic level and a safe DCP needs to be developed.

## 2022-08-12 VITALS — DIASTOLIC BLOOD PRESSURE: 107 MMHG | SYSTOLIC BLOOD PRESSURE: 170 MMHG

## 2022-08-12 RX ADMIN — OLANZAPINE SCH MG: 5 TABLET, ORALLY DISINTEGRATING ORAL at 21:38

## 2022-08-12 RX ADMIN — OLANZAPINE SCH MG: 5 TABLET, ORALLY DISINTEGRATING ORAL at 07:34

## 2022-08-12 RX ADMIN — Medication SCH TAB: at 07:38

## 2022-08-12 NOTE — NUR
Nursing Progress Note: Breanne



Problem: 

Per her 5150 Pt has been planning her death for the month of August and has been getting rid 
of all her personal belongings. She is unable to formulate a plan for food, clothing and 
shelter. She also has stopped all her medications. Patient currently lives at the [facility].



Intervention:  

Medication given as ordered per rerakel except aspirin which the patient adamantly refuses 
despite education.  Provided with a safe and therapeutic environment, clear communication, 
active listening and positive encouragement. 



Response:

Patient is resting quietly in bed at the start of the shift.  She is resistive and 
oppositional when spoken to.  Lengthy encouragement is required for patient to take her 
medications.  She is finally cooperative with her Zyprexa and her Norvasc but will not take 
the aspirin stating, I think I only HAVE to take the Zyprexa.  Assessment is performed 
spread out over the day due to patient being uncooperative and her labile mood.  Speech is 
clear and pressured.  Patient makes frequent delusional statements and does not appear to 
understand why she is here.  



Plan:  

Patient continues to require crisis interruption and stabilization with medication 
management and monitoring in a safe and therapeutic environment.

## 2022-08-12 NOTE — NUR
Nursing Progress Note: Breanne



Problem: 

Per her 5150 Pt has been planning her death for the month of August and has been getting rid 
of all her personal belongings. She is unable to formulate a plan for food, clothing and 
shelter. She also has stopped all her medications. Patient currently lives at the [facility].



Intervention:  

 Patient is provided with a safe and therapeutic environment, clear communication, active 
listening and positive encouragement. 



Response:

The patient isolates in her room. She remains delusional. Patient is still labile about 
court ordered medication administration. Patient states she "has filed an appeal." The 
patient believes she will prevail and not have to take medications soon. This writer was 
assisted in medication administration by another nurse who has better success with this 
particular patient with getting her to take medication. The patient does comply and 
reluctantly takes her night time medication. She then goes to sleep. The patient denied any 
H/I, S/I, or any hallucinations. 

 



Plan:  

Patient continues to require crisis interruption and stabilization with medication 
management and monitoring in a safe and therapeutic environment.

## 2022-08-13 VITALS — DIASTOLIC BLOOD PRESSURE: 102 MMHG | SYSTOLIC BLOOD PRESSURE: 165 MMHG

## 2022-08-13 VITALS — DIASTOLIC BLOOD PRESSURE: 103 MMHG | SYSTOLIC BLOOD PRESSURE: 148 MMHG

## 2022-08-13 RX ADMIN — Medication SCH TAB: at 07:47

## 2022-08-13 RX ADMIN — OLANZAPINE SCH MG: 5 TABLET, ORALLY DISINTEGRATING ORAL at 07:39

## 2022-08-13 RX ADMIN — OLANZAPINE SCH MG: 5 TABLET, ORALLY DISINTEGRATING ORAL at 20:39

## 2022-08-13 NOTE — NUR
Nursing Progress Note: Breanne



Problem: 

Per her 5150 Pt has been planning her death for the month of August and has been getting rid 
of all her personal belongings. She is unable to formulate a plan for food, clothing and 
shelter. She also has stopped all her medications. Patient currently lives at the [facility].



Intervention:  

Medication given as ordered per reise except aspirin which the patient adamantly refuses 
despite education.  Provided with a safe and therapeutic environment, clear communication, 
active listening and positive encouragement. 



Response:

Patient is resting quietly in bed at the start of the shift.  Requires encouragement to take 
AM Zyprexa as prescribed.  Patient refuses all other AM medication despite education on 
their importance.  Perseverates on the wording of her Reise paperwork in the morning.  
Isolates to her room most of the shift and comes out only for meals and to meet her needs.  
Denies any mental health symptoms.  Patient makes delusional statements at times.  



Plan:  

Patient continues to require crisis interruption and stabilization with medication 
management and monitoring in a safe and therapeutic environment.

## 2022-08-14 VITALS — SYSTOLIC BLOOD PRESSURE: 160 MMHG | DIASTOLIC BLOOD PRESSURE: 67 MMHG

## 2022-08-14 VITALS — DIASTOLIC BLOOD PRESSURE: 96 MMHG | SYSTOLIC BLOOD PRESSURE: 157 MMHG

## 2022-08-14 RX ADMIN — OLANZAPINE SCH MG: 5 TABLET, ORALLY DISINTEGRATING ORAL at 21:02

## 2022-08-14 RX ADMIN — Medication SCH TAB: at 07:53

## 2022-08-14 RX ADMIN — OLANZAPINE SCH MG: 5 TABLET, ORALLY DISINTEGRATING ORAL at 07:54

## 2022-08-14 NOTE — NUR
Nursing Progress Note: Breanne



Problem: 

Per her 5150 Pt has been planning her death for the month of August and has been getting rid 
of all her personal belongings. She is unable to formulate a plan for food, clothing and 
shelter. She also has stopped all her medications. Patient currently lives at the [facility].



Intervention:  

Provided with a safe and therapeutic environment, clear communication, active listening and 
positive encouragement. 



Response:

Patient is resting quietly in bed at the start of the shift.  Refuses all AM medications 
except for Zyprexa.  She states, I will only take 1.  Dose is 2 tabs and patient requires 
encouragement to take both which she eventually does.  Appears irritable when approached.  
Isolates to her room most of the shift.  



Plan:  

Patient continues to require crisis interruption and stabilization with medication 
management and monitoring in a safe and therapeutic environment.

## 2022-08-14 NOTE — NUR
Nursing Progress Note:



Problem: 

Per her 5150 Pt has been planning her death for the month of August and has been getting rid 
of all her personal belongings. She is unable to formulate a plan for food, clothing and 
shelter. She also has stopped all her medications. Patient currently lives at the [facility].



Intervention:  

 Patient is provided with a safe and therapeutic environment, clear communication, active 
listening and positive encouragement. 



Response:

This patient isolates in her room. She continues to exhibit her anger about having to take 
medications. Patient reluctantly

takes medications and then continues to expresses her anger. She then returns to sleep.



Plan:  

Patient continues to require crisis interruption and stabilization with medication 
management and monitoring in a safe and therapeutic environment.

## 2022-08-15 VITALS — SYSTOLIC BLOOD PRESSURE: 139 MMHG | DIASTOLIC BLOOD PRESSURE: 88 MMHG

## 2022-08-15 VITALS — SYSTOLIC BLOOD PRESSURE: 153 MMHG | DIASTOLIC BLOOD PRESSURE: 100 MMHG

## 2022-08-15 RX ADMIN — OLANZAPINE SCH MG: 5 TABLET, ORALLY DISINTEGRATING ORAL at 20:35

## 2022-08-15 RX ADMIN — Medication SCH TAB: at 07:46

## 2022-08-15 RX ADMIN — OLANZAPINE SCH MG: 5 TABLET, ORALLY DISINTEGRATING ORAL at 07:39

## 2022-08-15 NOTE — NUR
NURSING PROGRESS NOTE



Problem: 

Per her 5150 Pt has been planning her death for the month of August and has been getting rid 
of all her personal belongings. She is unable to formulate a plan for food, clothing and 
shelter. She also has stopped all her medications. Patient currently lives at the [facility].



Intervention:  Provided with a safe and therapeutic environment, clear communication, active 
listening and positive encouragement. Educated pt on the risks and benefits of her 
non-psyche medications. Administered medication as per court order [place]. 



Response: Received patient sleeping at shift change, per sleep assessment pt slept 10.75 
hours. Pt continues to be resistive to care. When pt woke she came to writer stating I am 
being held hostage at [facility]. Pt wanted to see 
the Zyprexa pill package before she would take medication How much is that?  Pt continues 
to refuse her non psyche meds. Pts blood pressure was 153/100 (automatic cuff), pt refused 
a retake. Writer educated and reinforced the risk of not taking her BP med pt stated my 
blood pressure is my business. I dont trust that machine anything over 80 is wrong. Pt is 
visible on unit, however spends most of her time in her room, out for meals and snacks. 



Plan:  Patient is taking medication only because she is under court ordered Reise. 
Medication seems to be working because patient is not as tangential or disorganized. However 
pts medication is not a therapeutic level. A safe discharge plan will need to be developed.

## 2022-08-15 NOTE — NUR
F/u 8/15: Pt placed on heart healthy diet given hx HTN refuses to take 

meds per MD note PO mostly ~100% avg meals meeting needs. LBM 8/14 per 

EMR. Noted wt as 100g in EMR error; [facility] d/w RN regarding scaled wt this 

admit. Will continue to monitor.

Recommendations:

1. Continue heart healthy diet per MD

2. Smoothie WL

3. Bowel care PRN

4. Weekly wts

-------------------------------------------------------------------------------

Addendum: 08/15/22 at 0840 by Tu Nash RD

-------------------------------------------------------------------------------

Amended: Links added.

## 2022-08-15 NOTE — NUR
Nursing Progress Note: Breanne



Problem: 

Per her 5150 Pt has been planning her death for the month of August and has been getting rid 
of all her personal belongings. She is unable to formulate a plan for food, clothing and 
shelter. She also has stopped all her medications. Patient currently lives at the [facility].



Intervention:  

Provided with a safe and therapeutic environment, clear communication, active listening and 
positive encouragement. 



Response:

Patient is pleasant and cooperative this shift; compliant with medication. She continues to 
hesitate but with encouragement took PO medication. PRN Ativan was provided. She denies SI, 
HI, A/VH; appears to be internally preoccupied and expresses her frustration about being 
"forced" to take medication. She reported wanting to talk with a  and wanting 
to discuss discharge planning tomorrow. She was provided HS snack prior to bed; observed 
sleeping and does not appear to be having difficulty.  



Plan:  

Patient continues to require crisis interruption and stabilization with medication 
management and monitoring in a safe and therapeutic environment.

## 2022-08-16 VITALS — DIASTOLIC BLOOD PRESSURE: 98 MMHG | SYSTOLIC BLOOD PRESSURE: 165 MMHG

## 2022-08-16 VITALS — SYSTOLIC BLOOD PRESSURE: 165 MMHG

## 2022-08-16 RX ADMIN — OLANZAPINE SCH MG: 5 TABLET, ORALLY DISINTEGRATING ORAL at 08:12

## 2022-08-16 RX ADMIN — Medication SCH TAB: at 08:00

## 2022-08-16 NOTE — NUR
DISCHARGE NOTE;





Patient was discharged from unit at 1600. Pt was discharged to [facility] taken 
by taxi. Writer reviewed discharge instructions, explaining she had a mental health follow 
up on 9/7 @ 11 with Dr. Schmid. Pt verbalized understanding. Pt presents a little disorganized, 
kept repeating "I have business at [facility] [employer]." Pt left with all personal belongings to 
include CDL, SS card, debit care, check book and birth certificate.

## 2022-08-16 NOTE — NUR
Nursing Progress Note: Breanne



Problem: 

Per her 5150 Pt has been planning her death for the month of August and has been getting rid 
of all her personal belongings. She is unable to formulate a plan for food, clothing and 
shelter. She also has stopped all her medications. Patient currently lives at the [facility].



Intervention:  

Provided with a safe and therapeutic environment, clear communication, active listening and 
positive encouragement. 



Response:

Patient continues to be resistive to care but cooperated and compliant with PO medication. 
PRN Ativan provided. She reported "I am expecting to be released tomorrow. I am being held 
against my will and it is a real emergency." She denies SI, HI, A/VH. Patient provided HS 
snack prior to bed; observed sleeping and does not appear to be having difficulty. 



Plan:  

Patient continues to require crisis interruption and stabilization with medication 
management and monitoring in a safe and therapeutic environment.

## 2024-04-19 NOTE — NUR
Nursing Progress Note:

Legal hold: 5270 Expires 10/3.

Client on involuntary status for GD

Report received from nurse with use of SBAR: YAMILKA Saha

Why are they here:  This is a 65-year-old female who was brought to the Emergency Room via 
EMS. She was found in a pile of dirt and leaves on the ground. The patient is somewhat 
confused and having auditory hallucinations. The patient has a history of schizophrenia, 
homelessness, and was weak and exhausted as reported by the ER nursing staff. Patient was 
then admitted to the Neuro floor where she was treated for heat exhaustion, metabolic 
encephalopathy, hypokalemia, and head lice. A 5150 was placed for GD. On admission, patient 
is delusional, she explains to this writer that she has been excluded from the "[place] 
[employer]." Patient states she hears voices but will not elaborate on what they are saying. 

Assessment: 

What happened this shift:   

 Patient was in bed sleeping at shift change. Awoke pt for medication and she stated that 
she is feeling good and waiting for d/c tomorrow. Pt.s medications are in the  [facility]. 

S/I, H/I: Denies 

A/VH: Denies

Sleep: Pt. did not nap today 

ADL's: Independent

Group attendance: No 

Were meds taken: Yes

Any med S/E: Denies 

Mental Status Exam

Appearance: Clean, short, buzzed hair, wearing green unit scrubs and cardigan. 

Eye contact: Good 

Behavior: Cooperative, isolates to room, watches TV at times. 

Speech: Soft, minimal, normal rate/rhythm. 

Mood: Euthymic 

Affect: Congruent with mood

Thought process: Linear

Thought Content:  Future oriented about discharge to [facility]. 

Cognition: A&O X4

Insight: Fair 

Judgment: Fair

Interventions

PRN's used: None show